# Patient Record
Sex: FEMALE | Race: WHITE | Employment: FULL TIME | URBAN - METROPOLITAN AREA
[De-identification: names, ages, dates, MRNs, and addresses within clinical notes are randomized per-mention and may not be internally consistent; named-entity substitution may affect disease eponyms.]

---

## 2017-05-11 ENCOUNTER — HOSPITAL ENCOUNTER (OUTPATIENT)
Age: 37
Setting detail: SPECIMEN
Discharge: HOME | End: 2017-05-11
Payer: COMMERCIAL

## 2017-05-11 ENCOUNTER — OFFICE VISIT (OUTPATIENT)
Dept: GYNECOLOGIC ONCOLOGY | Age: 37
End: 2017-05-11
Payer: COMMERCIAL

## 2017-05-11 VITALS
OXYGEN SATURATION: 99 % | TEMPERATURE: 98.9 F | BODY MASS INDEX: 22.5 KG/M2 | DIASTOLIC BLOOD PRESSURE: 94 MMHG | WEIGHT: 140 LBS | HEART RATE: 83 BPM | SYSTOLIC BLOOD PRESSURE: 130 MMHG | HEIGHT: 66 IN | RESPIRATION RATE: 19 BRPM

## 2017-05-11 DIAGNOSIS — R87.629 ABNORMAL VAGINAL PAP SMEAR: Primary | ICD-10-CM

## 2017-05-11 PROCEDURE — 99212 OFFICE O/P EST SF 10 MIN: CPT | Performed by: OBSTETRICS & GYNECOLOGY

## 2017-05-11 ASSESSMENT — ENCOUNTER SYMPTOMS
BLOOD IN STOOL: 0
SHORTNESS OF BREATH: 0
ABDOMINAL PAIN: 0
ABDOMINAL DISTENTION: 0
GASTROINTESTINAL NEGATIVE: 1
RESPIRATORY NEGATIVE: 1
EYES NEGATIVE: 1
ALLERGIC/IMMUNOLOGIC NEGATIVE: 1

## 2017-05-15 ENCOUNTER — TELEPHONE (OUTPATIENT)
Dept: GYNECOLOGIC ONCOLOGY | Age: 37
End: 2017-05-15

## 2017-05-15 RX ORDER — FLUCONAZOLE 150 MG/1
150 TABLET ORAL ONCE
Qty: 2 TABLET | Refills: 0 | Status: SHIPPED | OUTPATIENT
Start: 2017-05-15 | End: 2017-05-15

## 2017-05-17 LAB
HPV SAMPLE: NORMAL
HPV SOURCE: NORMAL
HPV, GENOTYPE 16: NOT DETECTED
HPV, GENOTYPE 18: NOT DETECTED
HPV, HIGH RISK OTHER: NOT DETECTED
HPV, INTERPRETATION: NORMAL

## 2017-05-18 LAB — CYTOLOGY REPORT: NORMAL

## 2018-03-06 ENCOUNTER — OFFICE VISIT (OUTPATIENT)
Dept: FAMILY MEDICINE CLINIC | Age: 38
End: 2018-03-06
Payer: COMMERCIAL

## 2018-03-06 VITALS
DIASTOLIC BLOOD PRESSURE: 81 MMHG | OXYGEN SATURATION: 100 % | SYSTOLIC BLOOD PRESSURE: 121 MMHG | TEMPERATURE: 99 F | HEART RATE: 96 BPM | WEIGHT: 142.2 LBS | BODY MASS INDEX: 22.85 KG/M2 | HEIGHT: 66 IN

## 2018-03-06 DIAGNOSIS — Z90.711 S/P SUBTOTAL HYSTERECTOMY: ICD-10-CM

## 2018-03-06 DIAGNOSIS — F43.20 ADJUSTMENT DISORDER, UNSPECIFIED TYPE: ICD-10-CM

## 2018-03-06 DIAGNOSIS — Z83.3 FH: DIABETES MELLITUS: ICD-10-CM

## 2018-03-06 DIAGNOSIS — G56.01 CARPAL TUNNEL SYNDROME OF RIGHT WRIST: ICD-10-CM

## 2018-03-06 DIAGNOSIS — R73.9 HYPERGLYCEMIA: ICD-10-CM

## 2018-03-06 DIAGNOSIS — D64.9 ANEMIA, UNSPECIFIED TYPE: Primary | ICD-10-CM

## 2018-03-06 DIAGNOSIS — E55.9 VITAMIN D DEFICIENCY: ICD-10-CM

## 2018-03-06 DIAGNOSIS — Z13.220 SCREENING, LIPID: ICD-10-CM

## 2018-03-06 DIAGNOSIS — Z82.49 FH: CAD (CORONARY ARTERY DISEASE): ICD-10-CM

## 2018-03-06 DIAGNOSIS — Z13.29 SCREENING FOR THYROID DISORDER: ICD-10-CM

## 2018-03-06 DIAGNOSIS — E53.9 DEFICIENCY OF VITAMIN B: ICD-10-CM

## 2018-03-06 DIAGNOSIS — G47.00 INSOMNIA, UNSPECIFIED TYPE: ICD-10-CM

## 2018-03-06 DIAGNOSIS — Z11.4 SCREENING FOR HIV (HUMAN IMMUNODEFICIENCY VIRUS): ICD-10-CM

## 2018-03-06 DIAGNOSIS — Z77.22 SECOND HAND TOBACCO SMOKE EXPOSURE: ICD-10-CM

## 2018-03-06 PROCEDURE — G8420 CALC BMI NORM PARAMETERS: HCPCS | Performed by: FAMILY MEDICINE

## 2018-03-06 PROCEDURE — G8427 DOCREV CUR MEDS BY ELIG CLIN: HCPCS | Performed by: FAMILY MEDICINE

## 2018-03-06 PROCEDURE — 1036F TOBACCO NON-USER: CPT | Performed by: FAMILY MEDICINE

## 2018-03-06 PROCEDURE — G8484 FLU IMMUNIZE NO ADMIN: HCPCS | Performed by: FAMILY MEDICINE

## 2018-03-06 PROCEDURE — 99204 OFFICE O/P NEW MOD 45 MIN: CPT | Performed by: FAMILY MEDICINE

## 2018-03-06 RX ORDER — HYDROXYZINE HYDROCHLORIDE 10 MG/1
10 TABLET, FILM COATED ORAL NIGHTLY
Qty: 60 TABLET | Refills: 0 | Status: SHIPPED | OUTPATIENT
Start: 2018-03-06 | End: 2018-03-16

## 2018-03-06 RX ORDER — ESCITALOPRAM OXALATE 10 MG/1
10 TABLET ORAL DAILY
Qty: 30 TABLET | Refills: 0 | Status: SHIPPED | OUTPATIENT
Start: 2018-03-06 | End: 2018-03-27 | Stop reason: DRUGHIGH

## 2018-03-06 RX ORDER — IBUPROFEN 200 MG
200 TABLET ORAL
COMMUNITY
End: 2018-03-27 | Stop reason: CLARIF

## 2018-03-06 RX ORDER — LORATADINE 10 MG/1
10 TABLET ORAL DAILY
COMMUNITY
End: 2019-07-02

## 2018-03-06 ASSESSMENT — ENCOUNTER SYMPTOMS
BLOOD IN STOOL: 0
TROUBLE SWALLOWING: 0
EYE REDNESS: 0
SHORTNESS OF BREATH: 0
BACK PAIN: 0
EYE DISCHARGE: 0
COUGH: 0
EYE PAIN: 0
COLOR CHANGE: 0
ABDOMINAL PAIN: 0
NAUSEA: 0
ANAL BLEEDING: 0
VOICE CHANGE: 0
CONSTIPATION: 1
SINUS PRESSURE: 0
DIARRHEA: 0
RECTAL PAIN: 0
VOMITING: 0
ABDOMINAL DISTENTION: 0
CHEST TIGHTNESS: 0

## 2018-03-06 ASSESSMENT — PATIENT HEALTH QUESTIONNAIRE - PHQ9
1. LITTLE INTEREST OR PLEASURE IN DOING THINGS: 0
SUM OF ALL RESPONSES TO PHQ9 QUESTIONS 1 & 2: 0
SUM OF ALL RESPONSES TO PHQ QUESTIONS 1-9: 0
1. LITTLE INTEREST OR PLEASURE IN DOING THINGS: 0
SUM OF ALL RESPONSES TO PHQ QUESTIONS 1-9: 0
SUM OF ALL RESPONSES TO PHQ9 QUESTIONS 1 & 2: 0
2. FEELING DOWN, DEPRESSED OR HOPELESS: 0
2. FEELING DOWN, DEPRESSED OR HOPELESS: 0

## 2018-03-06 NOTE — PROGRESS NOTES
person, place, and time. She has normal reflexes. No cranial nerve deficit. Coordination normal.   Skin: Skin is warm and dry. No rash noted. Psychiatric: She has a normal mood and affect. Tinels and phalens testing done. neg- feels fingers twitch on phalens testing. Assessment:      1. Anemia, unspecified type  CBC    Folate    Vitamin B12    Iron And TIBC   2. Screening for thyroid disorder  T4, Free    T3, Free    TSH without Reflex   3. Screening, lipid  Comprehensive Metabolic Panel    Lipid Panel   4. Deficiency of vitamin B     5. Vitamin D deficiency  Vitamin D 25 Hydroxy   6. Hyperglycemia  Hemoglobin A1C   7. FH: CAD (coronary artery disease)     8. FH: diabetes mellitus     9. Second hand tobacco smoke exposure     10. Screening for HIV (human immunodeficiency virus)  HIV Screen   11. S/P subtotal hysterectomy     12. Carpal tunnel syndrome of right wrist  56013 - Motor/Sens Nrve Conduct Test   13. Adjustment disorder, unspecified type  escitalopram (LEXAPRO) 10 MG tablet   14.  Insomnia, unspecified type  hydrOXYzine (ATARAX) 10 MG tablet         Plan:      Orders Placed This Encounter   Procedures    CBC    Comprehensive Metabolic Panel    Folate    Hemoglobin A1C    HIV Screen    Lipid Panel    T4, Free    T3, Free    TSH without Reflex    Vitamin B12    Vitamin D 25 Hydroxy    Iron And TIBC    31215 - Motor/Sens Nrve Conduct Test       Outpatient Encounter Prescriptions as of 3/6/2018   Medication Sig Dispense Refill    ibuprofen (ADVIL;MOTRIN) 200 MG tablet Take 200 mg by mouth      Multiple Vitamins-Minerals (MULTI-B-PLUS PO) Take by mouth      loratadine (CLARITIN) 10 MG tablet Take 10 mg by mouth daily      escitalopram (LEXAPRO) 10 MG tablet Take 1 tablet by mouth daily 30 tablet 0    hydrOXYzine (ATARAX) 10 MG tablet Take 1 tablet by mouth nightly for 10 days 60 tablet 0    cabergoline (DOSTINEX) 0.5 MG tablet        No facility-administered encounter medications on file as of 3/6/2018.

## 2018-03-11 LAB
ABSOLUTE BASO #: 0 K/UL (ref 0–0.1)
ABSOLUTE EOS #: 0.1 K/UL (ref 0.1–0.4)
ABSOLUTE LYMPH #: 1.2 K/UL (ref 0.8–5.2)
ABSOLUTE MONO #: 0.6 K/UL (ref 0.1–0.9)
ABSOLUTE NEUT #: 2.7 K/UL (ref 1.3–9.1)
ALBUMIN SERPL-MCNC: 4.4 G/DL (ref 3.5–5.2)
ALK PHOSPHATASE: 72 U/L (ref 30–101)
ALT SERPL-CCNC: 10 U/L (ref 5–40)
ANION GAP SERPL CALCULATED.3IONS-SCNC: 12 MEQ/L (ref 10–19)
AST SERPL-CCNC: 19 U/L (ref 9–40)
AVERAGE GLUCOSE: 111 MG/DL (ref 66–114)
BASOPHILS RELATIVE PERCENT: 0.2 %
BILIRUB SERPL-MCNC: 0.4 MG/DL
BUN BLDV-MCNC: 13 MG/DL (ref 8–23)
CALCIUM SERPL-MCNC: 9.5 MG/DL (ref 8.5–10.5)
CHLORIDE BLD-SCNC: 101 MEQ/L (ref 95–107)
CHOLESTEROL/HDL RATIO: 3.4
CHOLESTEROL: 159 MG/DL
CO2: 25 MEQ/L (ref 19–31)
CREAT SERPL-MCNC: 0.7 MG/DL (ref 0.6–1.3)
EGFR AFRICAN AMERICAN: 126.5 ML/MIN/1.73 M2
EGFR IF NONAFRICAN AMERICAN: 109.1 ML/MIN/1.73 M2
EOSINOPHILS RELATIVE PERCENT: 1.3 %
FOLATE: >20 UG/L
GLUCOSE: 85 MG/DL (ref 70–99)
HBA1C MFR BLD: 5.5 % (ref 4.2–5.8)
HCT VFR BLD CALC: 36.3 % (ref 36–48)
HDLC SERPL-MCNC: 47 MG/DL
HEMOGLOBIN: 11.9 G/DL (ref 12–16)
IRON SATURATION: 40 % (ref 20–50)
IRON, SERUM: 112 UG/DL (ref 37–145)
LDL CHOLESTEROL CALCULATED: 98 MG/DL
LDL/HDL RATIO: 2.1
LYMPHOCYTE %: 25.7 %
MCH RBC QN AUTO: 29.8 PG (ref 27–34)
MCHC RBC AUTO-ENTMCNC: 32.8 G/DL (ref 31–36)
MCV RBC AUTO: 91 FL (ref 80–100)
MONOCYTES # BLD: 13.4 %
NEUTROPHILS RELATIVE PERCENT: 59.2 %
PDW BLD-RTO: 12.6 % (ref 10.8–14.8)
PLATELETS: 269 K/UL (ref 150–450)
POTASSIUM SERPL-SCNC: 4.5 MEQ/L (ref 3.5–5.4)
RBC: 3.99 M/UL (ref 4–5.5)
SODIUM BLD-SCNC: 138 MEQ/L (ref 135–146)
T4 FREE: 0.97 NG/DL (ref 0.8–1.9)
TOTAL IRON BINDING CAPACITY: 279 MCG/DL (ref 250–450)
TOTAL PROTEIN: 7.6 G/DL (ref 6.1–8.3)
TRIGL SERPL-MCNC: 72 MG/DL
TSH SERPL DL<=0.05 MIU/L-ACNC: 2.47 UIU/ML (ref 0.4–4.1)
UNSATURATED IRON BINDING CAPACITY: 167 UG/DL (ref 112–347)
VITAMIN B-12: 583 PG/ML (ref 200–950)
VLDLC SERPL CALC-MCNC: 14 MG/DL
WBC: 4.6 K/UL (ref 3.7–10.8)

## 2018-03-12 LAB — HIV 1,2 COMBO ANTIGEN/ANTIBODY: NORMAL

## 2018-03-13 LAB
T3 FREE: 3.2 PG/ML (ref 2.2–4.2)
VITAMIN D 25-HYDROXY: 30 NG/ML

## 2018-03-27 ENCOUNTER — OFFICE VISIT (OUTPATIENT)
Dept: FAMILY MEDICINE CLINIC | Age: 38
End: 2018-03-27
Payer: COMMERCIAL

## 2018-03-27 VITALS
HEIGHT: 66 IN | WEIGHT: 144.8 LBS | SYSTOLIC BLOOD PRESSURE: 115 MMHG | OXYGEN SATURATION: 99 % | BODY MASS INDEX: 23.27 KG/M2 | HEART RATE: 67 BPM | DIASTOLIC BLOOD PRESSURE: 78 MMHG

## 2018-03-27 DIAGNOSIS — G47.00 INSOMNIA, UNSPECIFIED TYPE: ICD-10-CM

## 2018-03-27 DIAGNOSIS — G56.03 BILATERAL CARPAL TUNNEL SYNDROME: ICD-10-CM

## 2018-03-27 DIAGNOSIS — K59.00 CONSTIPATION, UNSPECIFIED CONSTIPATION TYPE: ICD-10-CM

## 2018-03-27 DIAGNOSIS — R53.83 FATIGUE, UNSPECIFIED TYPE: ICD-10-CM

## 2018-03-27 DIAGNOSIS — F39 MOOD DISORDER (HCC): ICD-10-CM

## 2018-03-27 DIAGNOSIS — D64.9 ANEMIA, UNSPECIFIED TYPE: Primary | ICD-10-CM

## 2018-03-27 DIAGNOSIS — Z90.711 S/P SUBTOTAL HYSTERECTOMY: ICD-10-CM

## 2018-03-27 PROCEDURE — G8427 DOCREV CUR MEDS BY ELIG CLIN: HCPCS | Performed by: FAMILY MEDICINE

## 2018-03-27 PROCEDURE — G8484 FLU IMMUNIZE NO ADMIN: HCPCS | Performed by: FAMILY MEDICINE

## 2018-03-27 PROCEDURE — 99214 OFFICE O/P EST MOD 30 MIN: CPT | Performed by: FAMILY MEDICINE

## 2018-03-27 PROCEDURE — 1036F TOBACCO NON-USER: CPT | Performed by: FAMILY MEDICINE

## 2018-03-27 PROCEDURE — G8420 CALC BMI NORM PARAMETERS: HCPCS | Performed by: FAMILY MEDICINE

## 2018-03-27 RX ORDER — HYDROXYZINE HYDROCHLORIDE 10 MG/1
10 TABLET, FILM COATED ORAL NIGHTLY
Qty: 90 TABLET | Refills: 1 | Status: SHIPPED | OUTPATIENT
Start: 2018-03-27 | End: 2018-06-25

## 2018-03-27 RX ORDER — ESCITALOPRAM OXALATE 20 MG/1
20 TABLET ORAL DAILY
Qty: 30 TABLET | Refills: 0 | Status: SHIPPED | OUTPATIENT
Start: 2018-03-27 | End: 2018-05-02 | Stop reason: CLARIF

## 2018-03-27 ASSESSMENT — ENCOUNTER SYMPTOMS
CHEST TIGHTNESS: 0
EYE REDNESS: 0
RECTAL PAIN: 0
ABDOMINAL DISTENTION: 0
CONSTIPATION: 0
TROUBLE SWALLOWING: 0
SHORTNESS OF BREATH: 0
ABDOMINAL PAIN: 0
ANAL BLEEDING: 0
EYE PAIN: 0
DIARRHEA: 0
BLOOD IN STOOL: 0
NAUSEA: 0
SINUS PRESSURE: 0
BACK PAIN: 0
EYE DISCHARGE: 0
COUGH: 0
VOICE CHANGE: 0
VOMITING: 0
COLOR CHANGE: 0

## 2018-03-27 NOTE — PROGRESS NOTES
Subjective:      Patient ID: Isaura Saba is a 34 y.o. female. HPI Here for a recheck- states constipation better after increasing water intake and also sleep has improved after she started the sleep aid prescribed. Lab show mild Anemia- anemia work up was neg. States takes prenatal vitamins daily. Takes only lettuce but no other greens. States mood not any diff after starting lexapro 10 mg and states would like to increase the dose. Her Bowels are better after increasing water. Still not feeling like exercising. Needs Test for CTS sx she has had x 18 months or more  Review of Systems   Constitutional: Positive for fatigue. Negative for activity change and appetite change. HENT: Negative for dental problem, ear pain, hearing loss, postnasal drip, sinus pressure, sneezing, tinnitus, trouble swallowing and voice change. Eyes: Negative for pain, discharge, redness and visual disturbance. Respiratory: Negative for cough, chest tightness and shortness of breath. Cardiovascular: Negative for chest pain, palpitations and leg swelling. Gastrointestinal: Negative for abdominal distention, abdominal pain, anal bleeding, blood in stool, constipation, diarrhea, nausea, rectal pain and vomiting. Endocrine: Negative for cold intolerance, heat intolerance, polydipsia, polyphagia and polyuria. Genitourinary: Negative for decreased urine volume, difficulty urinating, dyspareunia, dysuria, enuresis, flank pain, frequency, genital sores, hematuria, menstrual problem, pelvic pain, urgency, vaginal bleeding and vaginal discharge. Musculoskeletal: Negative for arthralgias, back pain, gait problem, joint swelling, myalgias, neck pain and neck stiffness. Skin: Negative for color change, pallor and rash. Allergic/Immunologic: Negative for environmental allergies, food allergies and immunocompromised state.    Neurological: Negative for dizziness, tremors, seizures, syncope, facial asymmetry, speech difficulty, weakness, light-headedness, numbness and headaches. Hematological: Negative for adenopathy. Does not bruise/bleed easily. Psychiatric/Behavioral: Negative for agitation, behavioral problems, confusion, decreased concentration, sleep disturbance and suicidal ideas. The patient is not nervous/anxious. Objective:   Physical Exam   Constitutional: She is oriented to person, place, and time. She appears well-developed and well-nourished. HENT:   Head: Normocephalic and atraumatic. Right Ear: External ear normal.   Left Ear: External ear normal.   Nose: Nose normal.   Mouth/Throat: Oropharynx is clear and moist. No oropharyngeal exudate. Eyes: Conjunctivae and EOM are normal. Pupils are equal, round, and reactive to light. Right eye exhibits no discharge. Left eye exhibits no discharge. No scleral icterus. Neck: Normal range of motion. Neck supple. No JVD present. No tracheal deviation present. No thyromegaly present. Cardiovascular: Normal rate, regular rhythm, normal heart sounds and intact distal pulses. Exam reveals no gallop and no friction rub. No murmur heard. Pulmonary/Chest: Effort normal and breath sounds normal. No respiratory distress. She has no wheezes. She has no rales. She exhibits no tenderness. Abdominal: Soft. Bowel sounds are normal. She exhibits no distension and no mass. There is no tenderness. There is no rebound and no guarding. Musculoskeletal: Normal range of motion. She exhibits no edema or tenderness. Lymphadenopathy:     She has no cervical adenopathy. Neurological: She is alert and oriented to person, place, and time. She has normal reflexes. No cranial nerve deficit. Coordination normal.   Skin: Skin is warm and dry. No rash noted. Psychiatric: She has a normal mood and affect. Assessment:      1. Anemia, unspecified type  POCT Fecal Immunochemical Test (FIT)    [facility] Deidra Francis MD*   2.  Fatigue,

## 2018-04-05 ENCOUNTER — TELEPHONE (OUTPATIENT)
Dept: FAMILY MEDICINE CLINIC | Age: 38
End: 2018-04-05

## 2018-04-13 ENCOUNTER — TELEPHONE (OUTPATIENT)
Dept: FAMILY MEDICINE CLINIC | Age: 38
End: 2018-04-13

## 2018-05-02 ENCOUNTER — OFFICE VISIT (OUTPATIENT)
Dept: FAMILY MEDICINE CLINIC | Age: 38
End: 2018-05-02
Payer: COMMERCIAL

## 2018-05-02 VITALS
DIASTOLIC BLOOD PRESSURE: 72 MMHG | BODY MASS INDEX: 23.21 KG/M2 | SYSTOLIC BLOOD PRESSURE: 104 MMHG | HEIGHT: 66 IN | WEIGHT: 144.4 LBS | OXYGEN SATURATION: 98 % | HEART RATE: 82 BPM

## 2018-05-02 DIAGNOSIS — K59.00 CONSTIPATION, UNSPECIFIED CONSTIPATION TYPE: ICD-10-CM

## 2018-05-02 DIAGNOSIS — F39 MOOD DISORDER (HCC): ICD-10-CM

## 2018-05-02 DIAGNOSIS — F41.1 GAD (GENERALIZED ANXIETY DISORDER): Primary | ICD-10-CM

## 2018-05-02 DIAGNOSIS — G47.00 INSOMNIA, UNSPECIFIED TYPE: ICD-10-CM

## 2018-05-02 PROCEDURE — G8427 DOCREV CUR MEDS BY ELIG CLIN: HCPCS | Performed by: FAMILY MEDICINE

## 2018-05-02 PROCEDURE — 1036F TOBACCO NON-USER: CPT | Performed by: FAMILY MEDICINE

## 2018-05-02 PROCEDURE — 99214 OFFICE O/P EST MOD 30 MIN: CPT | Performed by: FAMILY MEDICINE

## 2018-05-02 PROCEDURE — G8420 CALC BMI NORM PARAMETERS: HCPCS | Performed by: FAMILY MEDICINE

## 2018-05-02 RX ORDER — PAROXETINE HYDROCHLORIDE 20 MG/1
20 TABLET, FILM COATED ORAL DAILY
Qty: 30 TABLET | Refills: 3 | Status: SHIPPED | OUTPATIENT
Start: 2018-05-02 | End: 2018-09-04 | Stop reason: SDUPTHER

## 2018-05-02 ASSESSMENT — ENCOUNTER SYMPTOMS
SINUS PRESSURE: 0
SHORTNESS OF BREATH: 0
CONSTIPATION: 0
COUGH: 0
EYE REDNESS: 0
DIARRHEA: 0
VOMITING: 0
ANAL BLEEDING: 0
EYE DISCHARGE: 0
CHEST TIGHTNESS: 0
NAUSEA: 0
RECTAL PAIN: 0
BACK PAIN: 0
ABDOMINAL DISTENTION: 0
VOICE CHANGE: 0
COLOR CHANGE: 0
EYE PAIN: 0
BLOOD IN STOOL: 0
TROUBLE SWALLOWING: 0
ABDOMINAL PAIN: 0

## 2018-05-21 ENCOUNTER — HOSPITAL ENCOUNTER (OUTPATIENT)
Age: 38
Setting detail: SPECIMEN
Discharge: HOME | End: 2018-05-21
Payer: COMMERCIAL

## 2018-05-24 ENCOUNTER — OFFICE VISIT (OUTPATIENT)
Dept: GYNECOLOGIC ONCOLOGY | Age: 38
End: 2018-05-24
Payer: COMMERCIAL

## 2018-05-24 ENCOUNTER — HOSPITAL ENCOUNTER (OUTPATIENT)
Age: 38
Setting detail: SPECIMEN
Discharge: HOME | End: 2018-05-24
Payer: COMMERCIAL

## 2018-05-24 VITALS
HEART RATE: 77 BPM | OXYGEN SATURATION: 100 % | WEIGHT: 142.6 LBS | SYSTOLIC BLOOD PRESSURE: 115 MMHG | DIASTOLIC BLOOD PRESSURE: 86 MMHG | BODY MASS INDEX: 22.92 KG/M2 | TEMPERATURE: 98.5 F | HEIGHT: 66 IN

## 2018-05-24 DIAGNOSIS — Z12.31 ENCOUNTER FOR SCREENING MAMMOGRAM FOR BREAST CANCER: ICD-10-CM

## 2018-05-24 DIAGNOSIS — Z12.72 SCREENING FOR VAGINAL CANCER: Primary | ICD-10-CM

## 2018-05-24 DIAGNOSIS — R30.0 DYSURIA: ICD-10-CM

## 2018-05-24 DIAGNOSIS — Z90.710 HX OF TOTAL HYSTERECTOMY: ICD-10-CM

## 2018-05-24 LAB — SURGICAL PATHOLOGY REPORT: NORMAL

## 2018-05-24 PROCEDURE — G8427 DOCREV CUR MEDS BY ELIG CLIN: HCPCS | Performed by: OBSTETRICS & GYNECOLOGY

## 2018-05-24 PROCEDURE — 99213 OFFICE O/P EST LOW 20 MIN: CPT | Performed by: OBSTETRICS & GYNECOLOGY

## 2018-05-24 PROCEDURE — G8420 CALC BMI NORM PARAMETERS: HCPCS | Performed by: OBSTETRICS & GYNECOLOGY

## 2018-05-24 PROCEDURE — 1036F TOBACCO NON-USER: CPT | Performed by: OBSTETRICS & GYNECOLOGY

## 2018-05-24 RX ORDER — SULFAMETHOXAZOLE AND TRIMETHOPRIM 400; 80 MG/1; MG/1
1 TABLET ORAL 2 TIMES DAILY
Qty: 20 TABLET | Refills: 0 | Status: SHIPPED | OUTPATIENT
Start: 2018-05-24 | End: 2018-06-03

## 2018-05-24 RX ORDER — PHENAZOPYRIDINE HYDROCHLORIDE 200 MG/1
200 TABLET, FILM COATED ORAL 3 TIMES DAILY PRN
Qty: 9 TABLET | Refills: 0 | Status: SHIPPED | OUTPATIENT
Start: 2018-05-24 | End: 2018-05-27

## 2018-05-24 RX ORDER — FLUCONAZOLE 150 MG/1
150 TABLET ORAL ONCE
Qty: 1 TABLET | Refills: 0 | Status: SHIPPED | OUTPATIENT
Start: 2018-05-24 | End: 2018-05-24

## 2018-05-24 ASSESSMENT — ENCOUNTER SYMPTOMS
BLOOD IN STOOL: 0
RESPIRATORY NEGATIVE: 1
EYES NEGATIVE: 1
GASTROINTESTINAL NEGATIVE: 1
ABDOMINAL DISTENTION: 0
ABDOMINAL PAIN: 0
SHORTNESS OF BREATH: 0
ALLERGIC/IMMUNOLOGIC NEGATIVE: 1

## 2018-05-31 ENCOUNTER — HOSPITAL ENCOUNTER (OUTPATIENT)
Age: 38
End: 2018-05-31
Payer: COMMERCIAL

## 2018-05-31 ENCOUNTER — HOSPITAL ENCOUNTER (OUTPATIENT)
Dept: MAMMOGRAPHY | Age: 38
Discharge: HOME | End: 2018-06-02
Payer: COMMERCIAL

## 2018-05-31 DIAGNOSIS — Z12.31 ENCOUNTER FOR SCREENING MAMMOGRAM FOR BREAST CANCER: ICD-10-CM

## 2018-05-31 PROCEDURE — 77067 SCR MAMMO BI INCL CAD: CPT

## 2018-06-04 DIAGNOSIS — R92.2 DENSE BREAST TISSUE ON MAMMOGRAM: Primary | ICD-10-CM

## 2018-06-12 LAB — CYTOLOGY REPORT: NORMAL

## 2018-09-06 ENCOUNTER — TELEPHONE (OUTPATIENT)
Dept: GYNECOLOGIC ONCOLOGY | Age: 38
End: 2018-09-06

## 2018-09-21 ENCOUNTER — OFFICE VISIT (OUTPATIENT)
Dept: FAMILY MEDICINE CLINIC | Age: 38
End: 2018-09-21
Payer: COMMERCIAL

## 2018-09-21 VITALS
OXYGEN SATURATION: 100 % | SYSTOLIC BLOOD PRESSURE: 136 MMHG | HEART RATE: 85 BPM | HEIGHT: 66 IN | BODY MASS INDEX: 25.49 KG/M2 | WEIGHT: 158.6 LBS | DIASTOLIC BLOOD PRESSURE: 100 MMHG

## 2018-09-21 DIAGNOSIS — G47.00 INSOMNIA, UNSPECIFIED TYPE: ICD-10-CM

## 2018-09-21 DIAGNOSIS — I51.7 LVH (LEFT VENTRICULAR HYPERTROPHY): ICD-10-CM

## 2018-09-21 DIAGNOSIS — I10 ESSENTIAL HYPERTENSION: ICD-10-CM

## 2018-09-21 DIAGNOSIS — F41.1 GAD (GENERALIZED ANXIETY DISORDER): Primary | ICD-10-CM

## 2018-09-21 PROCEDURE — 99214 OFFICE O/P EST MOD 30 MIN: CPT | Performed by: FAMILY MEDICINE

## 2018-09-21 PROCEDURE — G8427 DOCREV CUR MEDS BY ELIG CLIN: HCPCS | Performed by: FAMILY MEDICINE

## 2018-09-21 PROCEDURE — 1036F TOBACCO NON-USER: CPT | Performed by: FAMILY MEDICINE

## 2018-09-21 PROCEDURE — G8419 CALC BMI OUT NRM PARAM NOF/U: HCPCS | Performed by: FAMILY MEDICINE

## 2018-09-21 RX ORDER — PAROXETINE HYDROCHLORIDE 40 MG/1
40 TABLET, FILM COATED ORAL DAILY
Qty: 90 TABLET | Refills: 0 | Status: SHIPPED | OUTPATIENT
Start: 2018-09-21 | End: 2019-06-13

## 2018-09-21 RX ORDER — BISOPROLOL FUMARATE 10 MG/1
10 TABLET ORAL DAILY
Qty: 30 TABLET | Refills: 3 | Status: SHIPPED | OUTPATIENT
Start: 2018-09-21 | End: 2019-07-02 | Stop reason: ALTCHOICE

## 2018-09-21 ASSESSMENT — ENCOUNTER SYMPTOMS
EYE REDNESS: 0
TROUBLE SWALLOWING: 0
CONSTIPATION: 0
VOICE CHANGE: 0
RECTAL PAIN: 0
BLOOD IN STOOL: 0
COLOR CHANGE: 0
SHORTNESS OF BREATH: 0
BACK PAIN: 0
VOMITING: 0
CHEST TIGHTNESS: 0
ANAL BLEEDING: 0
ABDOMINAL DISTENTION: 0
ABDOMINAL PAIN: 0
DIARRHEA: 0
NAUSEA: 0
EYE PAIN: 0
SINUS PRESSURE: 0
COUGH: 0
EYE DISCHARGE: 0

## 2018-09-21 NOTE — PROGRESS NOTES
Subjective:      Patient ID: Sriram Mckeon is a 34 y.o. female. HPI States under stress a lot recently, dog passed on,  had injury, mom got into an accident and had to have surgery. Also few weeks ago grandma passed away. Sleep ok, wakes up once or twice. Not exercising as she feels dizzy and gets chest pains. Event going up a flight of stairs feels dizzy and winded. Started with Headaches a couple of weeks ago as well. Review of Systems   Constitutional: Negative for activity change, appetite change and fatigue. HENT: Negative for dental problem, ear pain, hearing loss, postnasal drip, sinus pressure, sneezing, tinnitus, trouble swallowing and voice change. Eyes: Negative for pain, discharge, redness and visual disturbance. Respiratory: Negative for cough, chest tightness and shortness of breath. Cardiovascular: Negative for chest pain, palpitations and leg swelling. Gastrointestinal: Negative for abdominal distention, abdominal pain, anal bleeding, blood in stool, constipation, diarrhea, nausea, rectal pain and vomiting. Endocrine: Negative for cold intolerance, heat intolerance, polydipsia, polyphagia and polyuria. Genitourinary: Negative for decreased urine volume, difficulty urinating, dyspareunia, dysuria, enuresis, flank pain, frequency, genital sores, hematuria, menstrual problem, pelvic pain, urgency, vaginal bleeding and vaginal discharge. Musculoskeletal: Negative for arthralgias, back pain, gait problem, joint swelling, myalgias, neck pain and neck stiffness. Skin: Negative for color change, pallor and rash. Allergic/Immunologic: Negative for environmental allergies, food allergies and immunocompromised state. Neurological: Positive for headaches. Negative for dizziness, tremors, seizures, syncope, facial asymmetry, speech difficulty, weakness, light-headedness and numbness. Hematological: Negative for adenopathy. Does not bruise/bleed easily.

## 2018-09-24 ENCOUNTER — OFFICE VISIT (OUTPATIENT)
Dept: FAMILY MEDICINE CLINIC | Age: 38
End: 2018-09-24
Payer: COMMERCIAL

## 2018-09-24 VITALS
HEART RATE: 63 BPM | OXYGEN SATURATION: 99 % | BODY MASS INDEX: 25.68 KG/M2 | WEIGHT: 159.8 LBS | SYSTOLIC BLOOD PRESSURE: 121 MMHG | DIASTOLIC BLOOD PRESSURE: 82 MMHG | HEIGHT: 66 IN

## 2018-09-24 DIAGNOSIS — F41.1 GAD (GENERALIZED ANXIETY DISORDER): ICD-10-CM

## 2018-09-24 DIAGNOSIS — I10 ESSENTIAL HYPERTENSION: Primary | ICD-10-CM

## 2018-09-24 DIAGNOSIS — F39 MOOD DISORDER (HCC): ICD-10-CM

## 2018-09-24 PROCEDURE — 1036F TOBACCO NON-USER: CPT | Performed by: FAMILY MEDICINE

## 2018-09-24 PROCEDURE — 99213 OFFICE O/P EST LOW 20 MIN: CPT | Performed by: FAMILY MEDICINE

## 2018-09-24 PROCEDURE — G8427 DOCREV CUR MEDS BY ELIG CLIN: HCPCS | Performed by: FAMILY MEDICINE

## 2018-09-24 PROCEDURE — G8419 CALC BMI OUT NRM PARAM NOF/U: HCPCS | Performed by: FAMILY MEDICINE

## 2018-09-24 ASSESSMENT — ENCOUNTER SYMPTOMS
COLOR CHANGE: 0
SHORTNESS OF BREATH: 0
ABDOMINAL DISTENTION: 0
NAUSEA: 0
CHEST TIGHTNESS: 0
CONSTIPATION: 0
VOICE CHANGE: 0
SINUS PRESSURE: 0
BLOOD IN STOOL: 0
EYE DISCHARGE: 0
BACK PAIN: 0
EYE REDNESS: 0
RECTAL PAIN: 0
DIARRHEA: 0
ABDOMINAL PAIN: 0
TROUBLE SWALLOWING: 0
VOMITING: 0
COUGH: 0
ANAL BLEEDING: 0
EYE PAIN: 0

## 2018-09-24 ASSESSMENT — PATIENT HEALTH QUESTIONNAIRE - PHQ9
SUM OF ALL RESPONSES TO PHQ9 QUESTIONS 1 & 2: 0
1. LITTLE INTEREST OR PLEASURE IN DOING THINGS: 0
SUM OF ALL RESPONSES TO PHQ QUESTIONS 1-9: 0
2. FEELING DOWN, DEPRESSED OR HOPELESS: 0
SUM OF ALL RESPONSES TO PHQ QUESTIONS 1-9: 0

## 2018-09-24 NOTE — PROGRESS NOTES
sleep disturbance and suicidal ideas. The patient is not nervous/anxious. Objective:   Physical Exam   Constitutional: She is oriented to person, place, and time. She appears well-developed and well-nourished. HENT:   Head: Normocephalic and atraumatic. Right Ear: External ear normal.   Left Ear: External ear normal.   Nose: Nose normal.   Mouth/Throat: Oropharynx is clear and moist. No oropharyngeal exudate. Eyes: Pupils are equal, round, and reactive to light. Conjunctivae and EOM are normal. Right eye exhibits no discharge. Left eye exhibits no discharge. No scleral icterus. Neck: Normal range of motion. Neck supple. No JVD present. No tracheal deviation present. No thyromegaly present. Cardiovascular: Normal rate, regular rhythm, normal heart sounds and intact distal pulses. Exam reveals no gallop and no friction rub. No murmur heard. Pulmonary/Chest: Effort normal and breath sounds normal. No respiratory distress. She has no wheezes. She has no rales. She exhibits no tenderness. Abdominal: Soft. Bowel sounds are normal. She exhibits no distension and no mass. There is no tenderness. There is no rebound and no guarding. Musculoskeletal: Normal range of motion. She exhibits no edema or tenderness. Lymphadenopathy:     She has no cervical adenopathy. Neurological: She is alert and oriented to person, place, and time. She has normal reflexes. No cranial nerve deficit. Coordination normal.   Skin: Skin is warm and dry. No rash noted. Psychiatric: She has a normal mood and affect. Assessment:       Diagnosis Orders   1. Essential hypertension     2. JOSHUA (generalized anxiety disorder)     3. Mood disorder ([facility].)           Plan:      No orders of the defined types were placed in this encounter.       Outpatient Encounter Prescriptions as of 9/24/2018   Medication Sig Dispense Refill    PARoxetine (PAXIL) 40 MG tablet Take 1 tablet by mouth daily 90 tablet 0    bisoprolol (ZEBETA) 10 MG tablet Take 1 tablet by mouth daily 30 tablet 3    Multiple Vitamins-Minerals (MULTI-B-PLUS PO) Take by mouth      loratadine (CLARITIN) 10 MG tablet Take 10 mg by mouth daily      cabergoline (DOSTINEX) 0.5 MG tablet        No facility-administered encounter medications on file as of 9/24/2018.             Aguilar Saenz MD

## 2018-10-19 ENCOUNTER — HOSPITAL ENCOUNTER (OUTPATIENT)
Dept: MAMMOGRAPHY | Age: 38
Discharge: HOME | End: 2018-10-21
Payer: COMMERCIAL

## 2018-10-19 DIAGNOSIS — R92.2 DENSE BREAST TISSUE ON MAMMOGRAM: ICD-10-CM

## 2018-10-19 PROCEDURE — G0279 TOMOSYNTHESIS, MAMMO: HCPCS

## 2019-03-12 ENCOUNTER — HOSPITAL ENCOUNTER (OUTPATIENT)
Age: 39
Discharge: HOME | End: 2019-03-12

## 2019-03-12 LAB — RUBV IGG SER QL: 3.9 IU/ML

## 2019-03-12 PROCEDURE — 86481 TB AG RESPONSE T-CELL SUSP: CPT

## 2019-03-12 PROCEDURE — 86765 RUBEOLA ANTIBODY: CPT

## 2019-03-12 PROCEDURE — 86762 RUBELLA ANTIBODY: CPT

## 2019-03-12 PROCEDURE — 86735 MUMPS ANTIBODY: CPT

## 2019-03-12 PROCEDURE — 86787 VARICELLA-ZOSTER ANTIBODY: CPT

## 2019-03-15 LAB — T-SPOT TB TEST: NORMAL

## 2019-03-17 LAB
MEASLES IMMUNE (IGG): 1.52
MUV IGG SER QL: 0.98
VZV IGG SER QL IA: 2.13

## 2019-03-22 ENCOUNTER — HOSPITAL ENCOUNTER (OUTPATIENT)
Age: 39
Discharge: HOME | End: 2019-03-22

## 2019-03-22 LAB — HCG, PREGNANCY URINE (POC): NEGATIVE

## 2019-03-22 PROCEDURE — 84703 CHORIONIC GONADOTROPIN ASSAY: CPT

## 2019-06-12 NOTE — PROGRESS NOTES
Lauren Anthony is a 37 y.o.  here for her annual exam.  The patient was seen and examined. The patients past medical, surgical, social and family history were reviewed. Current medications and allergies were reviewed, and documented in the chart. She is employed through [employer] for [employer]. She has 2 children over 18. Exercise Yes and intermittently  Diet Yes  Tobacco abuse No    Last PAP: May 2018- normal, hx of abnormal PAP yes - vaginal hr HPV  HAd vaginal hysterectomy in  for AUB, no hx of abnormal paps prior to hysterectomy. 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 Pap smears in 2014, 2014, 2015 and 2016 showed low-grade squamous intraepithelial lesion. Vaginal biopsy in 2016- mild dysplasia. PAP in [place] 2017 ASCUS with + HR HPV other vaginal biopsy at that time negative. She last saw gynecology oncologist Dr. Tara Morris in May 2018 and said if that negative cytology and negative HPV could follow with gynecologist for follow up pap in a year. Family hx uterine or ovarian cancer-denies  Last mammogram- October - diagnostic, after screening [place] 2018 showed asymmetry , mammogram scheduled for 19 Family hx of breast cancer - maternal aunt at 52 neg BRCA  Possibly hormone related was told to tell family not to take hormones. Denies family hx colon cancer. Sexually active: yes - with , multiple partners: No, Dyspareunia: No, Vaginal discharge: no,  UTI symptoms: no, voiding difficulties: no, bowels regular:has IBS mixed constipation/diarrhea bloating:no      Menstrual hx- had hysterectomy with ovaries preserved in  for AUB.      OB History    Para Term  AB Living   2 2 0     2   SAB TAB Ectopic Molar Multiple Live Births                    # Outcome Date GA Lbr Ammon/2nd Weight Sex Delivery Anes PTL Lv   2 Para      Vag-Spont      1 Para      Vag-Spont by mouth daily 30 tablet 3    Multiple Vitamins-Minerals (MULTI-B-PLUS PO) Take by mouth      loratadine (CLARITIN) 10 MG tablet Take 10 mg by mouth daily      cabergoline (DOSTINEX) 0.5 MG tablet        No current facility-administered medications for this visit. Subjective:     Review of Systems   Constitutional: Negative for chills, fatigue, fever and unexpected weight change. HENT: Negative for congestion and rhinorrhea. Eyes: Negative for visual disturbance. Respiratory: Negative for cough and shortness of breath. Cardiovascular: Negative for chest pain, palpitations and leg swelling. Gastrointestinal: Positive for constipation and diarrhea. Negative for abdominal pain, nausea and vomiting. Endocrine: Negative for cold intolerance, heat intolerance, polydipsia and polyuria. Genitourinary: Negative for dyspareunia, dysuria, flank pain, menstrual problem, pelvic pain, vaginal bleeding, vaginal discharge and vaginal pain. Musculoskeletal: Negative for back pain and myalgias. Skin: Negative for color change and rash. Neurological: Negative for dizziness, light-headedness and headaches. Hematological: Negative for adenopathy. Does not bruise/bleed easily. Psychiatric/Behavioral: Negative for self-injury and suicidal ideas. Objective:     Physical Exam   Constitutional: She is oriented to person, place, and time. She appears well-developed and well-nourished. No distress. HENT:   Head: Normocephalic and atraumatic. Right Ear: External ear normal.   Left Ear: External ear normal.   Nose: Nose normal.   Mouth/Throat: Oropharynx is clear and moist.   Eyes: Pupils are equal, round, and reactive to light. EOM are normal.   Neck: Normal range of motion. Neck supple. Cardiovascular: Normal rate, regular rhythm and normal heart sounds. Exam reveals no gallop and no friction rub. No murmur heard. Pulmonary/Chest: Effort normal and breath sounds normal. She has no wheezes. Abdominal: Soft. Bowel sounds are normal. There is no tenderness. There is no rigidity, no rebound, no guarding and no CVA tenderness. Hernia confirmed negative in the right inguinal area and confirmed negative in the left inguinal area. Genitourinary: Vagina normal and uterus normal. No labial fusion. There is no rash, tenderness, lesion or injury on the right labia. There is no rash, tenderness, lesion or injury on the left labia. Uterus is not enlarged and not tender. Cervix exhibits no motion tenderness, no discharge and no friability. Right adnexum displays no mass, no tenderness and no fullness. Left adnexum displays no mass, no tenderness and no fullness. No erythema, tenderness or bleeding in the vagina. No foreign body in the vagina. No signs of injury around the vagina. No vaginal discharge found. Musculoskeletal: Normal range of motion. Lymphadenopathy:        Right: No inguinal adenopathy present. Left: No inguinal adenopathy present. Neurological: She is alert and oriented to person, place, and time. No cranial nerve deficit. Skin: Skin is warm and dry. No rash noted. She is not diaphoretic. Psychiatric: She has a normal mood and affect. Her behavior is normal. Judgment and thought content normal.   Nursing note and vitals reviewed. /60 (Site: Right Upper Arm, Position: Sitting, Cuff Size: Medium Adult)   Ht 5' 6\" (1.676 m)   Wt 161 lb (73 kg)   BMI 25.99 kg/m²     Assessment:       Diagnosis Orders   1. Encounter for annual routine gynecological examination  PAP SMEAR   2. Vaginal high risk human papillomavirus (HPV) DNA test positive  PAP SMEAR       Breast exam completed. Pelvic exam pap smear collected and sent. Cultures sent No    Plan:   Collect pap- if negative cytology and -hpv follow up one year  BSE reviewed,  Mammogram scheduled 6/19/19    Cultures declined     Refill medication if appropriate  Diet & Exercise reviewed with pt.   Preventive  Health through PCP   RV prn/annual           Orders Placed This Encounter   Procedures    PAP SMEAR     Patient History:    No LMP recorded. Patient has had a hysterectomy. OBGYN Status: Hysterectomy  Past Surgical History:  6/26/14: COLPOSCOPY  2/12/2015: COLPOSCOPY      Comment:  due to lgsil on pap  11/2006: HYSTERECTOMY      Comment:  TVH; irregular bleeding, both ovaries are in place  No date: HYSTERECTOMY, VAGINAL  No date: OTHER SURGICAL HISTORY      Comment:  Septaplasty  1980: THROAT SURGERY      Comment:  cyst from throat      Social History    Tobacco Use      Smoking status: Never Smoker      Smokeless tobacco: Never Used       Standing Status:   Future     Standing Expiration Date:   6/13/2020     Order Specific Question:   Collection Type     Answer: Thin Prep     Order Specific Question:   Prior Abnormal Pap Test     Answer:   No     Order Specific Question:   Screening or Diagnostic     Answer:   Screening     Order Specific Question:   HPV Requested? Answer:   Yes     Order Specific Question:   High Risk Patient     Answer:   N/A        Patient given educational materials - seepatient instructions. Discussed use, benefit, and side effects of prescribed medications. All patient questions answered. Pt voiced understanding. Reviewed health maintenance. Instructed to continue current medications, diet and exercise. Patient agreedwith treatment plan. Follow up as directed.       Electronically signed by PADMINI Montoya CNP on 6/13/2019at 8:29 AM

## 2019-06-13 ENCOUNTER — HOSPITAL ENCOUNTER (OUTPATIENT)
Age: 39
Setting detail: SPECIMEN
Discharge: HOME | End: 2019-06-13
Payer: COMMERCIAL

## 2019-06-13 ENCOUNTER — OFFICE VISIT (OUTPATIENT)
Dept: OBGYN CLINIC | Age: 39
End: 2019-06-13
Payer: COMMERCIAL

## 2019-06-13 VITALS
HEIGHT: 66 IN | BODY MASS INDEX: 25.88 KG/M2 | DIASTOLIC BLOOD PRESSURE: 60 MMHG | SYSTOLIC BLOOD PRESSURE: 120 MMHG | WEIGHT: 161 LBS

## 2019-06-13 DIAGNOSIS — Z01.419 ENCOUNTER FOR ANNUAL ROUTINE GYNECOLOGICAL EXAMINATION: Primary | ICD-10-CM

## 2019-06-13 DIAGNOSIS — R87.811 VAGINAL HIGH RISK HUMAN PAPILLOMAVIRUS (HPV) DNA TEST POSITIVE: ICD-10-CM

## 2019-06-13 PROCEDURE — 99386 PREV VISIT NEW AGE 40-64: CPT | Performed by: NURSE PRACTITIONER

## 2019-06-13 ASSESSMENT — ENCOUNTER SYMPTOMS
NAUSEA: 0
BACK PAIN: 0
ABDOMINAL PAIN: 0
DIARRHEA: 1
COLOR CHANGE: 0
COUGH: 0
VOMITING: 0
RHINORRHEA: 0
CONSTIPATION: 1
SHORTNESS OF BREATH: 0

## 2019-06-13 NOTE — PATIENT INSTRUCTIONS
for you. · Ages 21 to 44: Some experts recommend that women have a clinical breast exam every 3 years, starting at age 21. Ask your doctor how often you should have this test. If you have a high risk for breast cancer, talk with your doctor about when to start yearly mammograms and other screening tests. · Ages 36 and older: Talk with your doctor about how often you should have mammograms and clinical breast exams. What is your risk for breast cancer? If you don't already know your risk of breast cancer, you can ask your doctor about it. You can also look it up at www.cancer.gov/bcrisktool/. If your doctor says that you have a high or very high risk, ask about ways to reduce your risk. These could include getting extra screening, taking medicine, or having surgery. If you have a strong family history of breast cancer, ask your doctor about genetic testing. What steps can you take to stay healthy? Some things that increase your risk of breast cancer, such as your age and being female, cannot be controlled. But you can do some things to stay as healthy as you can. · Learn what your breasts normally look and feel like. If you notice any changes, tell your doctor. · Drink alcohol wisely. Your risk goes up the more you drink. For the best health, women should have no more than 1 drink a day or 7 drinks a week. · If you smoke, quit. When you quit smoking, you lower your chances of getting many types of cancer. You can also do your best to eat well, be active, and stay at a healthy weight. Eating healthy foods and being active every day, as well as staying at a healthy weight, may help prevent cancer. Where can you learn more? Go to https://presley.[employer]. org and sign in to your [facility] ([facility]) account. Enter S449 in the [employer] box to learn more about \"Learning About Breast Cancer Screening. \"     If you do not have an account, please click on the \"Sign Up Now\" link.   Current as of: December 19, 2018  Content Version: 12.0  © 7434-2194 [employer]. Care instructions adapted under license by [facility] If you have questions about a medical condition or this instruction, always ask your healthcare professional. [address] any warranty or liability for your use of this information. Pap Test: Care Instructions  Your Care Instructions    The Pap test (also called a Pap smear) is a screening test for cancer of the cervix, which is the lower part of the uterus that opens into the vagina. The test can help your doctor find early changes in the cells that could lead to cancer. The sample of cells taken during your test has been sent to a lab so that an expert can look at the cells. It usually takes a week or two to get the results back. Follow-up care is a key part of your treatment and safety. Be sure to make and go to all appointments, and call your doctor if you are having problems. It's also a good idea to know your test results and keep a list of the medicines you take. What do the results mean? · A normal result means that the test did not find any abnormal cells in the sample. · An abnormal result can mean many things. Most of these are not cancer. The results of your test may be abnormal because:  ? You have an infection of the vagina or cervix, such as a yeast infection. ? You have an IUD (intrauterine device for birth control). ? You have low estrogen levels after menopause that are causing the cells to change. ? You have cell changes that may be a sign of precancer or cancer. The results are ranked based on how serious the changes might be. There are many other reasons why you might not get a normal result. If the results were abnormal, you may need to get another test within a few weeks or months.  If the results show changes that could be a sign of cancer, you may need a test called a colposcopy, which provides a more complete view

## 2019-06-18 DIAGNOSIS — B37.31 VAGINAL CANDIDA: Primary | ICD-10-CM

## 2019-06-18 LAB
CYTOLOGY REPORT: NORMAL
HPV SAMPLE: NORMAL
HPV, GENOTYPE 16: NOT DETECTED
HPV, GENOTYPE 18: NOT DETECTED
HPV, HIGH RISK OTHER: NOT DETECTED
HPV, INTERPRETATION: NORMAL
SPECIMEN DESCRIPTION: NORMAL

## 2019-06-18 RX ORDER — FLUCONAZOLE 150 MG/1
150 TABLET ORAL ONCE
Qty: 1 TABLET | Refills: 0 | Status: SHIPPED | OUTPATIENT
Start: 2019-06-18 | End: 2019-06-18

## 2019-06-19 ENCOUNTER — HOSPITAL ENCOUNTER (OUTPATIENT)
Dept: MAMMOGRAPHY | Age: 39
Discharge: HOME | End: 2019-06-21
Payer: COMMERCIAL

## 2019-06-19 DIAGNOSIS — Z12.39 BREAST CANCER SCREENING: ICD-10-CM

## 2019-06-19 PROCEDURE — 77063 BREAST TOMOSYNTHESIS BI: CPT

## 2019-06-21 ENCOUNTER — TELEPHONE (OUTPATIENT)
Dept: OBGYN CLINIC | Age: 39
End: 2019-06-21

## 2019-07-02 ENCOUNTER — OFFICE VISIT (OUTPATIENT)
Dept: PRIMARY CARE CLINIC | Age: 39
End: 2019-07-02
Payer: COMMERCIAL

## 2019-07-02 ENCOUNTER — TELEPHONE (OUTPATIENT)
Dept: PRIMARY CARE CLINIC | Age: 39
End: 2019-07-02

## 2019-07-02 VITALS
WEIGHT: 162 LBS | OXYGEN SATURATION: 97 % | HEIGHT: 66 IN | SYSTOLIC BLOOD PRESSURE: 132 MMHG | DIASTOLIC BLOOD PRESSURE: 98 MMHG | HEART RATE: 95 BPM | BODY MASS INDEX: 26.03 KG/M2

## 2019-07-02 DIAGNOSIS — J30.9 ALLERGIC RHINITIS, UNSPECIFIED SEASONALITY, UNSPECIFIED TRIGGER: ICD-10-CM

## 2019-07-02 DIAGNOSIS — R20.0 NUMBNESS AND TINGLING IN BOTH HANDS: ICD-10-CM

## 2019-07-02 DIAGNOSIS — M54.2 NECK PAIN: ICD-10-CM

## 2019-07-02 DIAGNOSIS — R51.9 CHRONIC DAILY HEADACHE: ICD-10-CM

## 2019-07-02 DIAGNOSIS — Z13.31 POSITIVE DEPRESSION SCREENING: ICD-10-CM

## 2019-07-02 DIAGNOSIS — R53.83 FATIGUE, UNSPECIFIED TYPE: ICD-10-CM

## 2019-07-02 DIAGNOSIS — F41.8 DEPRESSION WITH ANXIETY: ICD-10-CM

## 2019-07-02 DIAGNOSIS — Z00.00 HEALTH CARE MAINTENANCE: Primary | ICD-10-CM

## 2019-07-02 DIAGNOSIS — R20.2 NUMBNESS AND TINGLING IN BOTH HANDS: ICD-10-CM

## 2019-07-02 PROCEDURE — G8427 DOCREV CUR MEDS BY ELIG CLIN: HCPCS | Performed by: PHYSICIAN ASSISTANT

## 2019-07-02 PROCEDURE — 99204 OFFICE O/P NEW MOD 45 MIN: CPT | Performed by: PHYSICIAN ASSISTANT

## 2019-07-02 PROCEDURE — G8419 CALC BMI OUT NRM PARAM NOF/U: HCPCS | Performed by: PHYSICIAN ASSISTANT

## 2019-07-02 PROCEDURE — G8431 POS CLIN DEPRES SCRN F/U DOC: HCPCS | Performed by: PHYSICIAN ASSISTANT

## 2019-07-02 PROCEDURE — G0444 DEPRESSION SCREEN ANNUAL: HCPCS | Performed by: PHYSICIAN ASSISTANT

## 2019-07-02 PROCEDURE — 1036F TOBACCO NON-USER: CPT | Performed by: PHYSICIAN ASSISTANT

## 2019-07-02 RX ORDER — OMEPRAZOLE 20 MG/1
20 CAPSULE, DELAYED RELEASE ORAL DAILY
COMMUNITY
End: 2020-04-17

## 2019-07-02 RX ORDER — ESCITALOPRAM OXALATE 10 MG/1
10 TABLET ORAL DAILY
Qty: 30 TABLET | Refills: 1 | Status: SHIPPED | OUTPATIENT
Start: 2019-07-02 | End: 2019-07-29 | Stop reason: SDUPTHER

## 2019-07-02 RX ORDER — CELECOXIB 200 MG/1
200 CAPSULE ORAL 2 TIMES DAILY
Qty: 60 CAPSULE | Refills: 0 | Status: SHIPPED | OUTPATIENT
Start: 2019-07-02 | End: 2019-08-15 | Stop reason: SDUPTHER

## 2019-07-02 SDOH — HEALTH STABILITY: MENTAL HEALTH: HOW OFTEN DO YOU HAVE A DRINK CONTAINING ALCOHOL?: MONTHLY OR LESS

## 2019-07-02 ASSESSMENT — ENCOUNTER SYMPTOMS
CHEST TIGHTNESS: 0
VOICE CHANGE: 0
COUGH: 0
BACK PAIN: 0
SHORTNESS OF BREATH: 0
PHOTOPHOBIA: 0
DIARRHEA: 0
EYE ITCHING: 0
VOMITING: 0
CONSTIPATION: 0
BLOOD IN STOOL: 0
ABDOMINAL PAIN: 0
TROUBLE SWALLOWING: 0
EYE PAIN: 0
NAUSEA: 0

## 2019-07-02 ASSESSMENT — PATIENT HEALTH QUESTIONNAIRE - PHQ9
10. IF YOU CHECKED OFF ANY PROBLEMS, HOW DIFFICULT HAVE THESE PROBLEMS MADE IT FOR YOU TO DO YOUR WORK, TAKE CARE OF THINGS AT HOME, OR GET ALONG WITH OTHER PEOPLE: 2
SUM OF ALL RESPONSES TO PHQ QUESTIONS 1-9: 12
6. FEELING BAD ABOUT YOURSELF - OR THAT YOU ARE A FAILURE OR HAVE LET YOURSELF OR YOUR FAMILY DOWN: 1
1. LITTLE INTEREST OR PLEASURE IN DOING THINGS: 1
SUM OF ALL RESPONSES TO PHQ9 QUESTIONS 1 & 2: 4
SUM OF ALL RESPONSES TO PHQ QUESTIONS 1-9: 12
7. TROUBLE CONCENTRATING ON THINGS, SUCH AS READING THE NEWSPAPER OR WATCHING TELEVISION: 1
5. POOR APPETITE OR OVEREATING: 2
3. TROUBLE FALLING OR STAYING ASLEEP: 1
9. THOUGHTS THAT YOU WOULD BE BETTER OFF DEAD, OR OF HURTING YOURSELF: 0
8. MOVING OR SPEAKING SO SLOWLY THAT OTHER PEOPLE COULD HAVE NOTICED. OR THE OPPOSITE, BEING SO FIGETY OR RESTLESS THAT YOU HAVE BEEN MOVING AROUND A LOT MORE THAN USUAL: 0
4. FEELING TIRED OR HAVING LITTLE ENERGY: 3
2. FEELING DOWN, DEPRESSED OR HOPELESS: 3

## 2019-07-02 NOTE — TELEPHONE ENCOUNTER
Pt had an appt today with you. States you were going to order an EMG for her. Asking if you can put the order in epic? Pt will get it done at [facility].

## 2019-07-09 ENCOUNTER — HOSPITAL ENCOUNTER (OUTPATIENT)
Age: 39
Discharge: HOME | End: 2019-07-11
Payer: COMMERCIAL

## 2019-07-09 ENCOUNTER — HOSPITAL ENCOUNTER (OUTPATIENT)
Dept: GENERAL RADIOLOGY | Age: 39
Discharge: HOME | End: 2019-07-11
Payer: COMMERCIAL

## 2019-07-09 ENCOUNTER — HOSPITAL ENCOUNTER (OUTPATIENT)
Age: 39
Discharge: HOME | End: 2019-07-09
Payer: COMMERCIAL

## 2019-07-09 DIAGNOSIS — M54.2 NECK PAIN: ICD-10-CM

## 2019-07-09 DIAGNOSIS — R53.83 FATIGUE, UNSPECIFIED TYPE: ICD-10-CM

## 2019-07-09 DIAGNOSIS — Z00.00 HEALTH CARE MAINTENANCE: ICD-10-CM

## 2019-07-09 LAB
ABSOLUTE EOS #: 0.1 K/UL (ref 0–0.44)
ABSOLUTE IMMATURE GRANULOCYTE: <0.03 K/UL (ref 0–0.3)
ABSOLUTE LYMPH #: 1.64 K/UL (ref 1.1–3.7)
ABSOLUTE MONO #: 0.79 K/UL (ref 0.1–1.2)
ALBUMIN SERPL-MCNC: 4.1 G/DL (ref 3.5–5.2)
ALBUMIN/GLOBULIN RATIO: 1.1 (ref 1–2.5)
ALP BLD-CCNC: 91 U/L (ref 35–104)
ALT SERPL-CCNC: 9 U/L (ref 5–33)
ANION GAP SERPL CALCULATED.3IONS-SCNC: 10 MMOL/L (ref 9–17)
AST SERPL-CCNC: 12 U/L
BASOPHILS # BLD: 0 % (ref 0–2)
BASOPHILS ABSOLUTE: <0.03 K/UL (ref 0–0.2)
BILIRUB SERPL-MCNC: 0.38 MG/DL (ref 0.3–1.2)
BUN BLDV-MCNC: 13 MG/DL (ref 6–20)
BUN/CREAT BLD: NORMAL (ref 9–20)
CALCIUM SERPL-MCNC: 9.2 MG/DL (ref 8.6–10.4)
CHLORIDE BLD-SCNC: 102 MMOL/L (ref 98–107)
CHOLESTEROL/HDL RATIO: 3.2
CHOLESTEROL: 146 MG/DL
CO2: 26 MMOL/L (ref 20–31)
CREAT SERPL-MCNC: 0.59 MG/DL (ref 0.5–0.9)
DIFFERENTIAL TYPE: ABNORMAL
EOSINOPHILS RELATIVE PERCENT: 1 % (ref 1–4)
GFR AFRICAN AMERICAN: >60 ML/MIN
GFR NON-AFRICAN AMERICAN: >60 ML/MIN
GFR SERPL CREATININE-BSD FRML MDRD: NORMAL ML/MIN/{1.73_M2}
GFR SERPL CREATININE-BSD FRML MDRD: NORMAL ML/MIN/{1.73_M2}
GLUCOSE FASTING: 97 MG/DL (ref 70–99)
HCT VFR BLD CALC: 38.4 % (ref 36.3–47.1)
HDLC SERPL-MCNC: 46 MG/DL
HEMOGLOBIN: 12.1 G/DL (ref 11.9–15.1)
IMMATURE GRANULOCYTES: 0 %
LDL CHOLESTEROL: 78 MG/DL (ref 0–130)
LYMPHOCYTES # BLD: 23 % (ref 24–43)
MCH RBC QN AUTO: 30.6 PG (ref 25.2–33.5)
MCHC RBC AUTO-ENTMCNC: 31.5 G/DL (ref 28.4–34.8)
MCV RBC AUTO: 97 FL (ref 82.6–102.9)
MONOCYTES # BLD: 11 % (ref 3–12)
NRBC AUTOMATED: 0 PER 100 WBC
PDW BLD-RTO: 12.6 % (ref 11.8–14.4)
PLATELET # BLD: 334 K/UL (ref 138–453)
PLATELET ESTIMATE: ABNORMAL
PMV BLD AUTO: 10.2 FL (ref 8.1–13.5)
POTASSIUM SERPL-SCNC: 4.2 MMOL/L (ref 3.7–5.3)
RBC # BLD: 3.96 M/UL (ref 3.95–5.11)
RBC # BLD: ABNORMAL 10*6/UL
SEG NEUTROPHILS: 65 % (ref 36–65)
SEGMENTED NEUTROPHILS ABSOLUTE COUNT: 4.47 K/UL (ref 1.5–8.1)
SODIUM BLD-SCNC: 138 MMOL/L (ref 135–144)
TOTAL PROTEIN: 8 G/DL (ref 6.4–8.3)
TRIGL SERPL-MCNC: 112 MG/DL
TSH SERPL DL<=0.05 MIU/L-ACNC: 2 MIU/L (ref 0.3–5)
VLDLC SERPL CALC-MCNC: NORMAL MG/DL (ref 1–30)
WBC # BLD: 7 K/UL (ref 3.5–11.3)
WBC # BLD: ABNORMAL 10*3/UL

## 2019-07-09 PROCEDURE — 72040 X-RAY EXAM NECK SPINE 2-3 VW: CPT

## 2019-07-09 PROCEDURE — 84443 ASSAY THYROID STIM HORMONE: CPT

## 2019-07-09 PROCEDURE — 85025 COMPLETE CBC W/AUTO DIFF WBC: CPT

## 2019-07-09 PROCEDURE — 36415 COLL VENOUS BLD VENIPUNCTURE: CPT

## 2019-07-09 PROCEDURE — 80061 LIPID PANEL: CPT

## 2019-07-09 PROCEDURE — 80053 COMPREHEN METABOLIC PANEL: CPT

## 2019-07-29 DIAGNOSIS — F41.8 DEPRESSION WITH ANXIETY: ICD-10-CM

## 2019-07-29 RX ORDER — ESCITALOPRAM OXALATE 10 MG/1
10 TABLET ORAL DAILY
Qty: 90 TABLET | Refills: 1 | Status: SHIPPED | OUTPATIENT
Start: 2019-07-29 | End: 2019-08-01 | Stop reason: SDUPTHER

## 2019-07-29 NOTE — TELEPHONE ENCOUNTER
Last visit: 7/2/19  Last Med refill: 7/2/19  Does patient have enough medication for 72 hours:     Next Visit Date:  Future Appointments   Date Time Provider Raffi Francis   8/1/2019  8:00 AM RUDOLPH Ortiz [facility]   8/1/2019  6:00 PM Braulio Perkins, PT [facility] None   9/4/2019  4:00 PM [facility] EMG  [facility] None   9/4/2019  4:00 PM Adwoa Willard MD [facility] Maintenance   Topic Date Due    Flu vaccine (1) 09/01/2019    Potassium monitoring  07/09/2020    Creatinine monitoring  07/09/2020    Lipid screen  07/09/2024    DTaP/Tdap/Td vaccine (2 - Td) 08/22/2025    HIV screen  Completed    Pneumococcal 0-64 years Vaccine  Aged Out       Hemoglobin A1C (%)   Date Value   03/10/2018 5.5             ( goal A1C is < 7)   No results found for: LABMICR  LDL Cholesterol (mg/dL)   Date Value   07/09/2019 78     LDL Calculated (mg/dL)   Date Value   03/10/2018 98   03/21/2016 85       (goal LDL is <100)   AST (U/L)   Date Value   07/09/2019 12     ALT (U/L)   Date Value   07/09/2019 9     BUN (mg/dL)   Date Value   07/09/2019 13     BP Readings from Last 3 Encounters:   07/02/19 (!) 132/98   06/13/19 120/60   09/24/18 121/82          (goal 120/80)    All Future Testing planned in [facility]  Lab Frequency Next Occurrence   POCT Fecal Immunochemical Test (FIT) Once 10/25/2019   PAP SMEAR Once 06/13/2019   EMG Once 08/08/2019               Patient Active Problem List:     Vaginal high risk human papillomavirus (HPV) DNA test positive     Anemia     S/P subtotal hysterectomy     Second hand tobacco smoke exposure     Essential hypertension     JOSHUA (generalized anxiety disorder)     Mood disorder (HCC)     Depression with anxiety     Allergic rhinitis     Chronic daily headache     Fatigue     Neck pain

## 2019-08-01 ENCOUNTER — OFFICE VISIT (OUTPATIENT)
Dept: PRIMARY CARE CLINIC | Age: 39
End: 2019-08-01
Payer: COMMERCIAL

## 2019-08-01 ENCOUNTER — HOSPITAL ENCOUNTER (OUTPATIENT)
Dept: PHYSICAL THERAPY | Facility: CLINIC | Age: 39
Setting detail: THERAPIES SERIES
Discharge: HOME | End: 2019-08-01
Payer: COMMERCIAL

## 2019-08-01 VITALS
DIASTOLIC BLOOD PRESSURE: 88 MMHG | HEART RATE: 75 BPM | BODY MASS INDEX: 26.6 KG/M2 | OXYGEN SATURATION: 99 % | WEIGHT: 164.8 LBS | SYSTOLIC BLOOD PRESSURE: 118 MMHG

## 2019-08-01 DIAGNOSIS — R53.83 FATIGUE, UNSPECIFIED TYPE: ICD-10-CM

## 2019-08-01 DIAGNOSIS — F41.8 DEPRESSION WITH ANXIETY: ICD-10-CM

## 2019-08-01 DIAGNOSIS — M54.2 NECK PAIN: Primary | ICD-10-CM

## 2019-08-01 PROCEDURE — 1036F TOBACCO NON-USER: CPT | Performed by: PHYSICIAN ASSISTANT

## 2019-08-01 PROCEDURE — G8419 CALC BMI OUT NRM PARAM NOF/U: HCPCS | Performed by: PHYSICIAN ASSISTANT

## 2019-08-01 PROCEDURE — G8427 DOCREV CUR MEDS BY ELIG CLIN: HCPCS | Performed by: PHYSICIAN ASSISTANT

## 2019-08-01 PROCEDURE — 97110 THERAPEUTIC EXERCISES: CPT

## 2019-08-01 PROCEDURE — 97161 PT EVAL LOW COMPLEX 20 MIN: CPT

## 2019-08-01 PROCEDURE — 99214 OFFICE O/P EST MOD 30 MIN: CPT | Performed by: PHYSICIAN ASSISTANT

## 2019-08-01 RX ORDER — ESCITALOPRAM OXALATE 20 MG/1
20 TABLET ORAL DAILY
Qty: 90 TABLET | Refills: 0 | Status: SHIPPED | OUTPATIENT
Start: 2019-08-01 | End: 2019-09-04 | Stop reason: SDUPTHER

## 2019-08-01 ASSESSMENT — ENCOUNTER SYMPTOMS
WHEEZING: 0
EYE DISCHARGE: 0
VOMITING: 0
ABDOMINAL PAIN: 0
RHINORRHEA: 0
COUGH: 0
BACK PAIN: 1
EYE REDNESS: 0
SORE THROAT: 0
DIARRHEA: 0
SHORTNESS OF BREATH: 0
NAUSEA: 0

## 2019-08-01 NOTE — PROGRESS NOTES
Maternal Grandmother     Heart Disease Maternal Grandmother     Lung Cancer Paternal Grandfather     Diabetes Paternal Grandmother     Other Paternal Grandmother         brething problems    Depression Sister        Social History     Tobacco Use    Smoking status: Never Smoker    Smokeless tobacco: Never Used   Substance Use Topics    Alcohol use: Yes     Alcohol/week: 0.0 standard drinks     Frequency: Monthly or less     Comment: SOCIAL      Current Outpatient Medications   Medication Sig Dispense Refill    escitalopram (LEXAPRO) 20 MG tablet Take 1 tablet by mouth daily 90 tablet 0    Levocetirizine Dihydrochloride (XYZAL ALLERGY 24HR PO) Take by mouth      Fluticasone Furoate (FLONASE SENSIMIST NA) by Nasal route      omeprazole (PRILOSEC) 20 MG delayed release capsule Take 20 mg by mouth daily      celecoxib (CELEBREX) 200 MG capsule Take 1 capsule by mouth 2 times daily 60 capsule 0    Multiple Vitamins-Minerals (MULTI-B-PLUS PO) Take by mouth      cabergoline (DOSTINEX) 0.5 MG tablet        No current facility-administered medications for this visit. No Known Allergies    Health Maintenance   Topic Date Due    Flu vaccine (1) 09/01/2019    Potassium monitoring  07/09/2020    Creatinine monitoring  07/09/2020    Lipid screen  07/09/2024    DTaP/Tdap/Td vaccine (2 - Td) 08/22/2025    HIV screen  Completed    Pneumococcal 0-64 years Vaccine  Aged Out       Subjective:      Review of Systems   Constitutional: Positive for fatigue. Negative for chills and fever. HENT: Negative for rhinorrhea and sore throat. Eyes: Negative for discharge and redness. Respiratory: Negative for cough, shortness of breath and wheezing. Cardiovascular: Negative for chest pain and palpitations. Gastrointestinal: Negative for abdominal pain, diarrhea, nausea and vomiting. Genitourinary: Negative for dysuria and frequency. Musculoskeletal: Positive for back pain and neck pain.  Negative for

## 2019-08-08 ENCOUNTER — HOSPITAL ENCOUNTER (OUTPATIENT)
Dept: PHYSICAL THERAPY | Facility: CLINIC | Age: 39
Setting detail: THERAPIES SERIES
Discharge: HOME | End: 2019-08-08
Payer: COMMERCIAL

## 2019-08-08 PROCEDURE — 97110 THERAPEUTIC EXERCISES: CPT

## 2019-08-08 PROCEDURE — 97140 MANUAL THERAPY 1/> REGIONS: CPT

## 2019-08-12 ENCOUNTER — HOSPITAL ENCOUNTER (OUTPATIENT)
Dept: PHYSICAL THERAPY | Facility: CLINIC | Age: 39
Setting detail: THERAPIES SERIES
Discharge: HOME | End: 2019-08-12
Payer: COMMERCIAL

## 2019-08-12 PROCEDURE — 97110 THERAPEUTIC EXERCISES: CPT

## 2019-08-12 PROCEDURE — 97140 MANUAL THERAPY 1/> REGIONS: CPT

## 2019-08-15 ENCOUNTER — HOSPITAL ENCOUNTER (OUTPATIENT)
Dept: PHYSICAL THERAPY | Facility: CLINIC | Age: 39
Setting detail: THERAPIES SERIES
Discharge: HOME | End: 2019-08-15
Payer: COMMERCIAL

## 2019-08-15 NOTE — FLOWSHEET NOTE
[] [address]  [address]    [phone]  F: [phone]   [] [address]  [address] [facility]   [address]  P: [phone]  F: [phone]  [x] [address]  [address] [facility]  P: [phone]  F: [phone]   [] [address]  [facility] [address]   [place]: [phone]  F: [phone]  [] [place] [id number] [facility] [address]  [place]: [phone]   F: [phone]     Physical Therapy Cancel/No Show note    Date: 8/15/2019  Patient: Teresita Stern  : 1980  MRN: [id number]    Cancels/No Shows to date:     For today's appointment patient:    [x]  Cancelled    [] Rescheduled appointment    [] No-show     Reason given by patient:    []  Patient ill    []  Conflicting appointment    [] No transportation      [x] Conflict with work    [] No reason given    [] Weather related    [] Other:      Comments:        [] Next appointment was confirmed    Electronically signed by: Paul Quevedo PTA

## 2019-08-20 ENCOUNTER — HOSPITAL ENCOUNTER (OUTPATIENT)
Dept: PHYSICAL THERAPY | Facility: CLINIC | Age: 39
Setting detail: THERAPIES SERIES
Discharge: HOME | End: 2019-08-20
Payer: COMMERCIAL

## 2019-09-03 ENCOUNTER — HOSPITAL ENCOUNTER (OUTPATIENT)
Dept: PHYSICAL THERAPY | Facility: CLINIC | Age: 39
Setting detail: THERAPIES SERIES
Discharge: HOME | End: 2019-09-03
Payer: COMMERCIAL

## 2019-09-03 PROCEDURE — 97110 THERAPEUTIC EXERCISES: CPT

## 2019-09-03 PROCEDURE — 97140 MANUAL THERAPY 1/> REGIONS: CPT

## 2019-09-04 ENCOUNTER — HOSPITAL ENCOUNTER (OUTPATIENT)
Dept: NEUROLOGY | Age: 39
Discharge: HOME | End: 2019-09-04
Payer: COMMERCIAL

## 2019-09-04 DIAGNOSIS — F41.8 DEPRESSION WITH ANXIETY: ICD-10-CM

## 2019-09-04 PROCEDURE — 95911 NRV CNDJ TEST 9-10 STUDIES: CPT

## 2019-09-04 PROCEDURE — 95886 MUSC TEST DONE W/N TEST COMP: CPT

## 2019-09-04 RX ORDER — ESCITALOPRAM OXALATE 20 MG/1
20 TABLET ORAL DAILY
Qty: 90 TABLET | Refills: 3 | Status: SHIPPED | OUTPATIENT
Start: 2019-09-04 | End: 2020-04-17

## 2019-09-05 ENCOUNTER — HOSPITAL ENCOUNTER (OUTPATIENT)
Dept: PHYSICAL THERAPY | Facility: CLINIC | Age: 39
Setting detail: THERAPIES SERIES
Discharge: HOME | End: 2019-09-05
Payer: COMMERCIAL

## 2019-09-05 PROCEDURE — 97110 THERAPEUTIC EXERCISES: CPT

## 2019-09-10 ENCOUNTER — HOSPITAL ENCOUNTER (OUTPATIENT)
Dept: PHYSICAL THERAPY | Facility: CLINIC | Age: 39
Setting detail: THERAPIES SERIES
Discharge: HOME | End: 2019-09-10
Payer: COMMERCIAL

## 2019-09-10 PROCEDURE — 97110 THERAPEUTIC EXERCISES: CPT

## 2019-09-10 NOTE — FLOWSHEET NOTE
[] [address]  [address]  [phone]  F: [phone] [] [address]  [facility]   [address]  P: [phone]  F: [phone] [x] [place]  [address]  P: [phone]  F: [phone] [] [address]  [facility]   [address]  [place]: [phone]  F: [phone]     Physical Therapy Daily Treatment Note    Date:  9/10/2019  Patient Name:  Teresita Stern    :  1980  MRN: [id number]  Physician: Johanna March PA-C                                  Insurance: [facility] Diagnosis:   M54.2 (ICD-10-CM) - Neck pain           Rehab Codes: poor posture, MM weakness  Onset Date: 18                   Next 's appt. : tbd  Visit# / total visits:    Cancels/No Shows: 2/0    Subjective:    Pain:  [x] Yes  [] No Location: cervical Pain Rating: (0-10 scale) 4/10  Pain altered Tx:  [x] No  [] Yes  Action:  Comments: Pt mentions in general just feeling \"blah\" today, UT stiffness noted bilaterally. Objective:     Todays Treatment:  Modalities:   Precautions:  Manual: HYpervolt bilat thoracic paraspinals, periscapular - 5'    Exercises:  Exercise Reps/ Time Weight/ Level Comments    UBE 7'   x          Supine Cervical flexion 10x5\"        Upper trap stretch 3x30\"        Levator scap stretch 3x30\"        Scalenes stretch 3x30\"         Seated scapular retraction 20x3\"      Doorway stretch 3x30\" ea  90/90, \"Y\"            Prone scap retraction 20x3\"   x   Prone scap depression 20x3\"   x   Prone scap retract+ext 20x   x   Prone HAB 20x             Foam Roller thoracic rotation x3 ea direction  sitting    Foam roller laying 5mins   x   Foam Roller Reaching 2x10   x   Foam Roller circles x25   x   Foam roller scissors x25   x   Mid back series x20  plum bilaterally x          SL ER 20x  bilat            Tband HAB 20x  lime  x   Tband Bilat ER 20x  lime  x   Tband Rows 20x  plum  x   Other:     Specific Instructions for next treatment: Continue with program    Treatment Charges: Mins Units   []  Modalities     [x]  Ther Exercise 40 3   [x]  Manual Therapy     []  Ther Activities     []  Aquatics     []  Vasocompression     []  Other     Total Treatment time 40 3       Assessment: [x] Progressing toward goals. [] No change. [x] Other: Increased resistance with tband ex to continue to strengthen periscapular musculature for improved posture. Pt denies increased pain symptoms post ex. Good recall demonstrated. STG: (to be met in 6 treatments)  1. ? Pain: Report intermittent 4/10 pain  2. ? ROM: Patient to demonstrate 60 degrees R cervical rotation  3. ? Strength:  4. ? Function: Patient to demonstrate the ability to maintain neutral alignment  5. Independent with Home Exercise Programs  6. Demonstrate Knowledge of fall prevention  LTG: (to be met in 12 treatments)  1. Patient to report 0/10 pain with functional activities  2. Patient to demonstrate 4/5 posterior mm strength  3. Patient to report ability to complete functional activities without deficits on Neck disability index        Patient goals: To reduce pain    Pt. Education:  [x] Yes  [] No  [x] Reviewed Prior HEP/Ed  Method of Education: [x] Verbal  [] Demo  [] Written  Comprehension of Education:  [x] Verbalizes understanding. [x] Demonstrates understanding. [] Needs review. [] Demonstrates/verbalizes HEP/Ed previously given. Plan: [x] Continue per plan of care.    [] Other:      Time In: 6:00 PM            Time Out: 6:50 pm    Electronically signed by:  Gonsalo Jiménez PTA

## 2019-09-12 ENCOUNTER — OFFICE VISIT (OUTPATIENT)
Dept: PRIMARY CARE CLINIC | Age: 39
End: 2019-09-12
Payer: COMMERCIAL

## 2019-09-12 ENCOUNTER — HOSPITAL ENCOUNTER (OUTPATIENT)
Dept: PHYSICAL THERAPY | Facility: CLINIC | Age: 39
Setting detail: THERAPIES SERIES
Discharge: HOME | End: 2019-09-12
Payer: COMMERCIAL

## 2019-09-12 VITALS
WEIGHT: 168.2 LBS | DIASTOLIC BLOOD PRESSURE: 80 MMHG | HEART RATE: 72 BPM | OXYGEN SATURATION: 98 % | SYSTOLIC BLOOD PRESSURE: 124 MMHG | BODY MASS INDEX: 27.15 KG/M2

## 2019-09-12 DIAGNOSIS — R53.83 FATIGUE, UNSPECIFIED TYPE: Primary | ICD-10-CM

## 2019-09-12 DIAGNOSIS — G47.9 RESTLESS SLEEPER: ICD-10-CM

## 2019-09-12 DIAGNOSIS — G47.19 EXCESSIVE DAYTIME SLEEPINESS: ICD-10-CM

## 2019-09-12 DIAGNOSIS — R59.1 LYMPHADENOPATHY: ICD-10-CM

## 2019-09-12 DIAGNOSIS — F41.8 DEPRESSION WITH ANXIETY: ICD-10-CM

## 2019-09-12 DIAGNOSIS — Z23 NEED FOR VACCINATION: ICD-10-CM

## 2019-09-12 PROCEDURE — G8419 CALC BMI OUT NRM PARAM NOF/U: HCPCS | Performed by: PHYSICIAN ASSISTANT

## 2019-09-12 PROCEDURE — 90686 IIV4 VACC NO PRSV 0.5 ML IM: CPT | Performed by: PHYSICIAN ASSISTANT

## 2019-09-12 PROCEDURE — 90471 IMMUNIZATION ADMIN: CPT | Performed by: PHYSICIAN ASSISTANT

## 2019-09-12 PROCEDURE — G8427 DOCREV CUR MEDS BY ELIG CLIN: HCPCS | Performed by: PHYSICIAN ASSISTANT

## 2019-09-12 PROCEDURE — 99214 OFFICE O/P EST MOD 30 MIN: CPT | Performed by: PHYSICIAN ASSISTANT

## 2019-09-12 PROCEDURE — 1036F TOBACCO NON-USER: CPT | Performed by: PHYSICIAN ASSISTANT

## 2019-09-12 ASSESSMENT — ENCOUNTER SYMPTOMS
SHORTNESS OF BREATH: 0
NAUSEA: 0
BACK PAIN: 0
VOMITING: 0

## 2019-09-16 ENCOUNTER — HOSPITAL ENCOUNTER (OUTPATIENT)
Dept: PHYSICAL THERAPY | Facility: CLINIC | Age: 39
Setting detail: THERAPIES SERIES
Discharge: HOME | End: 2019-09-16
Payer: COMMERCIAL

## 2019-09-16 DIAGNOSIS — R20.0 NUMBNESS AND TINGLING IN BOTH HANDS: ICD-10-CM

## 2019-09-16 DIAGNOSIS — M54.2 NECK PAIN: ICD-10-CM

## 2019-09-16 DIAGNOSIS — R20.2 NUMBNESS AND TINGLING IN BOTH HANDS: ICD-10-CM

## 2019-09-16 PROCEDURE — 97110 THERAPEUTIC EXERCISES: CPT

## 2019-09-19 ENCOUNTER — APPOINTMENT (OUTPATIENT)
Dept: PHYSICAL THERAPY | Facility: CLINIC | Age: 39
End: 2019-09-19
Payer: COMMERCIAL

## 2019-09-27 ENCOUNTER — HOSPITAL ENCOUNTER (OUTPATIENT)
Dept: PHYSICAL THERAPY | Facility: CLINIC | Age: 39
Setting detail: THERAPIES SERIES
Discharge: HOME | End: 2019-09-27
Payer: COMMERCIAL

## 2019-09-27 PROCEDURE — 97110 THERAPEUTIC EXERCISES: CPT

## 2019-09-27 PROCEDURE — 97140 MANUAL THERAPY 1/> REGIONS: CPT

## 2019-10-04 ENCOUNTER — HOSPITAL ENCOUNTER (OUTPATIENT)
Age: 39
Setting detail: SPECIMEN
Discharge: HOME | End: 2019-10-04
Payer: COMMERCIAL

## 2019-10-04 ENCOUNTER — HOSPITAL ENCOUNTER (OUTPATIENT)
Dept: PHYSICAL THERAPY | Facility: CLINIC | Age: 39
Setting detail: THERAPIES SERIES
Discharge: HOME | End: 2019-10-04
Payer: COMMERCIAL

## 2019-10-04 ENCOUNTER — OFFICE VISIT (OUTPATIENT)
Dept: PRIMARY CARE CLINIC | Age: 39
End: 2019-10-04
Payer: COMMERCIAL

## 2019-10-04 VITALS
BODY MASS INDEX: 27.02 KG/M2 | DIASTOLIC BLOOD PRESSURE: 80 MMHG | HEART RATE: 77 BPM | OXYGEN SATURATION: 99 % | SYSTOLIC BLOOD PRESSURE: 124 MMHG | WEIGHT: 167.4 LBS

## 2019-10-04 DIAGNOSIS — R59.1 LYMPHADENOPATHY: ICD-10-CM

## 2019-10-04 DIAGNOSIS — R59.1 LYMPHADENOPATHY: Primary | ICD-10-CM

## 2019-10-04 LAB
ABSOLUTE EOS #: 0.09 K/UL (ref 0–0.44)
ABSOLUTE IMMATURE GRANULOCYTE: <0.03 K/UL (ref 0–0.3)
ABSOLUTE LYMPH #: 1.25 K/UL (ref 1.1–3.7)
ABSOLUTE MONO #: 0.54 K/UL (ref 0.1–1.2)
BASOPHILS # BLD: 0 % (ref 0–2)
BASOPHILS ABSOLUTE: <0.03 K/UL (ref 0–0.2)
DIFFERENTIAL TYPE: NORMAL
EOSINOPHILS RELATIVE PERCENT: 2 % (ref 1–4)
HCT VFR BLD CALC: 39.5 % (ref 36.3–47.1)
HEMOGLOBIN: 12.4 G/DL (ref 11.9–15.1)
IMMATURE GRANULOCYTES: 0 %
LYMPHOCYTES # BLD: 24 % (ref 24–43)
MCH RBC QN AUTO: 30.5 PG (ref 25.2–33.5)
MCHC RBC AUTO-ENTMCNC: 31.4 G/DL (ref 28.4–34.8)
MCV RBC AUTO: 97.1 FL (ref 82.6–102.9)
MONOCYTES # BLD: 10 % (ref 3–12)
NRBC AUTOMATED: 0 PER 100 WBC
PDW BLD-RTO: 12.9 % (ref 11.8–14.4)
PLATELET # BLD: 409 K/UL (ref 138–453)
PLATELET ESTIMATE: NORMAL
PMV BLD AUTO: 10.6 FL (ref 8.1–13.5)
RBC # BLD: 4.07 M/UL (ref 3.95–5.11)
RBC # BLD: NORMAL 10*6/UL
SEG NEUTROPHILS: 64 % (ref 36–65)
SEGMENTED NEUTROPHILS ABSOLUTE COUNT: 3.4 K/UL (ref 1.5–8.1)
WBC # BLD: 5.3 K/UL (ref 3.5–11.3)
WBC # BLD: NORMAL 10*3/UL

## 2019-10-04 PROCEDURE — G8482 FLU IMMUNIZE ORDER/ADMIN: HCPCS | Performed by: PHYSICIAN ASSISTANT

## 2019-10-04 PROCEDURE — G8427 DOCREV CUR MEDS BY ELIG CLIN: HCPCS | Performed by: PHYSICIAN ASSISTANT

## 2019-10-04 PROCEDURE — 99213 OFFICE O/P EST LOW 20 MIN: CPT | Performed by: PHYSICIAN ASSISTANT

## 2019-10-04 PROCEDURE — 1036F TOBACCO NON-USER: CPT | Performed by: PHYSICIAN ASSISTANT

## 2019-10-04 PROCEDURE — G8419 CALC BMI OUT NRM PARAM NOF/U: HCPCS | Performed by: PHYSICIAN ASSISTANT

## 2019-10-04 ASSESSMENT — ENCOUNTER SYMPTOMS
SORE THROAT: 0
CHOKING: 0
RHINORRHEA: 1
ABDOMINAL DISTENTION: 0
TROUBLE SWALLOWING: 0
EYES NEGATIVE: 1
COUGH: 0

## 2019-10-11 ENCOUNTER — HOSPITAL ENCOUNTER (OUTPATIENT)
Dept: PHYSICAL THERAPY | Facility: CLINIC | Age: 39
Setting detail: THERAPIES SERIES
Discharge: HOME | End: 2019-10-11
Payer: COMMERCIAL

## 2019-10-24 ENCOUNTER — HOSPITAL ENCOUNTER (OUTPATIENT)
Dept: SLEEP CENTER | Age: 39
Discharge: HOME | End: 2019-10-26
Payer: COMMERCIAL

## 2019-10-24 DIAGNOSIS — R53.83 FATIGUE, UNSPECIFIED TYPE: ICD-10-CM

## 2019-10-24 DIAGNOSIS — G47.9 RESTLESS SLEEPER: ICD-10-CM

## 2019-10-24 DIAGNOSIS — G47.19 EXCESSIVE DAYTIME SLEEPINESS: ICD-10-CM

## 2019-10-24 PROCEDURE — 95810 POLYSOM 6/> YRS 4/> PARAM: CPT

## 2019-10-29 ENCOUNTER — HOSPITAL ENCOUNTER (OUTPATIENT)
Dept: CT IMAGING | Age: 39
Discharge: HOME | End: 2019-10-31
Payer: COMMERCIAL

## 2019-10-29 DIAGNOSIS — I88.1 CHRONIC LYMPHADENITIS: ICD-10-CM

## 2019-10-29 DIAGNOSIS — E07.9 DISORDER OF THYROID GLAND: ICD-10-CM

## 2019-10-29 PROCEDURE — 6360000004 HC RX CONTRAST MEDICATION: Performed by: OTOLARYNGOLOGY

## 2019-10-29 PROCEDURE — 70491 CT SOFT TISSUE NECK W/DYE: CPT

## 2019-10-29 PROCEDURE — 2580000003 HC RX 258: Performed by: OTOLARYNGOLOGY

## 2019-10-29 RX ORDER — 0.9 % SODIUM CHLORIDE 0.9 %
80 INTRAVENOUS SOLUTION INTRAVENOUS ONCE
Status: COMPLETED | OUTPATIENT
Start: 2019-10-29 | End: 2019-10-29

## 2019-10-29 RX ORDER — SODIUM CHLORIDE 0.9 % (FLUSH) 0.9 %
10 SYRINGE (ML) INJECTION PRN
Status: DISCONTINUED | OUTPATIENT
Start: 2019-10-29 | End: 2019-11-01 | Stop reason: HOSPADM

## 2019-10-29 RX ADMIN — IOVERSOL 75 ML: 741 INJECTION INTRA-ARTERIAL; INTRAVENOUS at 17:22

## 2019-10-29 RX ADMIN — Medication 10 ML: at 17:22

## 2019-10-29 RX ADMIN — SODIUM CHLORIDE 80 ML: 9 INJECTION, SOLUTION INTRAVENOUS at 17:22

## 2019-11-15 LAB — STATUS: NORMAL

## 2019-11-21 ENCOUNTER — TELEPHONE (OUTPATIENT)
Dept: PRIMARY CARE CLINIC | Age: 39
End: 2019-11-21

## 2019-12-04 ENCOUNTER — TELEPHONE (OUTPATIENT)
Dept: PRIMARY CARE CLINIC | Age: 39
End: 2019-12-04

## 2019-12-12 ENCOUNTER — OFFICE VISIT (OUTPATIENT)
Dept: PRIMARY CARE CLINIC | Age: 39
End: 2019-12-12
Payer: COMMERCIAL

## 2019-12-12 VITALS
WEIGHT: 173.8 LBS | OXYGEN SATURATION: 98 % | BODY MASS INDEX: 27.93 KG/M2 | SYSTOLIC BLOOD PRESSURE: 130 MMHG | HEIGHT: 66 IN | DIASTOLIC BLOOD PRESSURE: 80 MMHG | HEART RATE: 55 BPM

## 2019-12-12 DIAGNOSIS — R53.83 FATIGUE, UNSPECIFIED TYPE: Primary | ICD-10-CM

## 2019-12-12 DIAGNOSIS — F41.1 GAD (GENERALIZED ANXIETY DISORDER): ICD-10-CM

## 2019-12-12 DIAGNOSIS — G47.19 EXCESSIVE DAYTIME SLEEPINESS: ICD-10-CM

## 2019-12-12 DIAGNOSIS — F39 MOOD DISORDER (HCC): ICD-10-CM

## 2019-12-12 DIAGNOSIS — F98.8 ATTENTION DEFICIT DISORDER (ADD) WITHOUT HYPERACTIVITY: ICD-10-CM

## 2019-12-12 PROBLEM — Z90.710 H/O: HYSTERECTOMY: Status: ACTIVE | Noted: 2018-03-06

## 2019-12-12 PROBLEM — J31.0 NON-ALLERGIC RHINITIS: Status: ACTIVE | Noted: 2019-12-12

## 2019-12-12 PROBLEM — R40.0 DAYTIME SOMNOLENCE: Status: ACTIVE | Noted: 2019-09-12

## 2019-12-12 PROBLEM — Z77.22 PASSIVE SMOKER: Status: ACTIVE | Noted: 2018-03-06

## 2019-12-12 PROCEDURE — 1036F TOBACCO NON-USER: CPT | Performed by: PHYSICIAN ASSISTANT

## 2019-12-12 PROCEDURE — G8427 DOCREV CUR MEDS BY ELIG CLIN: HCPCS | Performed by: PHYSICIAN ASSISTANT

## 2019-12-12 PROCEDURE — 99213 OFFICE O/P EST LOW 20 MIN: CPT | Performed by: PHYSICIAN ASSISTANT

## 2019-12-12 PROCEDURE — G8419 CALC BMI OUT NRM PARAM NOF/U: HCPCS | Performed by: PHYSICIAN ASSISTANT

## 2019-12-12 PROCEDURE — G8482 FLU IMMUNIZE ORDER/ADMIN: HCPCS | Performed by: PHYSICIAN ASSISTANT

## 2019-12-12 ASSESSMENT — ENCOUNTER SYMPTOMS
COUGH: 0
RHINORRHEA: 0
ABDOMINAL PAIN: 0
EYE DISCHARGE: 0
DIARRHEA: 0
EYE REDNESS: 0
WHEEZING: 0
NAUSEA: 0
VOMITING: 0
SHORTNESS OF BREATH: 0
SORE THROAT: 0

## 2020-03-24 RX ORDER — CELECOXIB 200 MG/1
CAPSULE ORAL
Qty: 180 CAPSULE | Refills: 1 | Status: SHIPPED | OUTPATIENT
Start: 2020-03-24 | End: 2021-03-16 | Stop reason: SDUPTHER

## 2020-04-17 ENCOUNTER — TELEPHONE (OUTPATIENT)
Dept: PRIMARY CARE CLINIC | Age: 40
End: 2020-04-17

## 2020-04-17 ENCOUNTER — TELEMEDICINE (OUTPATIENT)
Dept: PRIMARY CARE CLINIC | Age: 40
End: 2020-04-17
Payer: COMMERCIAL

## 2020-04-17 VITALS — HEIGHT: 66 IN | BODY MASS INDEX: 27.32 KG/M2 | WEIGHT: 170 LBS | HEART RATE: 79 BPM | TEMPERATURE: 98.4 F

## 2020-04-17 PROCEDURE — 99213 OFFICE O/P EST LOW 20 MIN: CPT | Performed by: PHYSICIAN ASSISTANT

## 2020-04-17 PROCEDURE — G8427 DOCREV CUR MEDS BY ELIG CLIN: HCPCS | Performed by: PHYSICIAN ASSISTANT

## 2020-04-17 RX ORDER — MOXIFLOXACIN 5 MG/ML
1 SOLUTION/ DROPS OPHTHALMIC 3 TIMES DAILY
Qty: 1 BOTTLE | Refills: 0 | Status: SHIPPED | OUTPATIENT
Start: 2020-04-17 | End: 2020-04-24

## 2020-04-17 ASSESSMENT — ENCOUNTER SYMPTOMS
EYE DISCHARGE: 1
EYE ITCHING: 1
EYE REDNESS: 1
EYE PAIN: 0
COUGH: 0
ABDOMINAL PAIN: 0
SHORTNESS OF BREATH: 0

## 2020-04-17 NOTE — PROGRESS NOTES
[facility]  [address]  Phone: [phone]  Fax: [phone]    Jose Fletcher is a 41 y.o. female who presents today for her medical conditions/complaintsas noted below. Chief Complaint   Patient presents with    Conjunctivitis     patient's left eye is red,itchy and burns. Patient said she woke up and her left eye was crusted shut. Patient said she woke up with sx today.  Other     pt consents to telehealth services. HPI:     HPI   Visit took place with Anastasiia Hein at her home with  in background audio and myself. Starting today patient has crusting in left eye. It is red and itchy. Denies any injury and no exposure to something getting into eye. Does wear contacts and has glasses on now. No new eye product or any  eye product. Has some allergy symptoms now and is taking her Flonase and Xyzal.  NO fever or other ill symptoms and no rash. It is affecting only her left eye. No one else has this around her. Current Outpatient Medications   Medication Sig Dispense Refill    moxifloxacin (VIGAMOX) 0.5 % ophthalmic solution Place 1 drop into both eyes 3 times daily for 7 days 1 Bottle 0    celecoxib (CELEBREX) 200 MG capsule TAKE 1 CAPSULE BY MOUTH TWICE DAILY 180 capsule 1    Levocetirizine Dihydrochloride (XYZAL ALLERGY 24HR PO) Take by mouth      Fluticasone Furoate (FLONASE SENSIMIST NA) by Nasal route      Multiple Vitamins-Minerals (MULTI-B-PLUS PO) Take by mouth      cabergoline (DOSTINEX) 0.5 MG tablet        No current facility-administered medications for this visit. No Known Allergies    Subjective:      Review of Systems   Constitutional: Negative for chills, diaphoresis and fever. HENT: Positive for congestion and postnasal drip. Eyes: Positive for discharge, redness and itching. Negative for pain. Respiratory: Negative for cough and shortness of breath.     Cardiovascular: Negative for

## 2020-04-17 NOTE — TELEPHONE ENCOUNTER
Patient called stating she has pink eye in Left eye and right eye is starting to become irritated. Experiencing Redness and burning. She is asking if something could be called into her pharmacy on file as soon as possible. Please contact her to advise. Thank you.

## 2020-06-23 ENCOUNTER — HOSPITAL ENCOUNTER (OUTPATIENT)
Dept: MAMMOGRAPHY | Age: 40
Discharge: HOME | End: 2020-06-25
Payer: COMMERCIAL

## 2020-06-23 ENCOUNTER — PATIENT MESSAGE (OUTPATIENT)
Dept: OBGYN CLINIC | Age: 40
End: 2020-06-23

## 2020-06-23 PROCEDURE — 77063 BREAST TOMOSYNTHESIS BI: CPT

## 2020-06-30 ENCOUNTER — HOSPITAL ENCOUNTER (OUTPATIENT)
Dept: WOMENS IMAGING | Age: 40
Discharge: HOME | End: 2020-07-02
Payer: COMMERCIAL

## 2020-06-30 PROCEDURE — 76642 ULTRASOUND BREAST LIMITED: CPT

## 2020-06-30 PROCEDURE — G0279 TOMOSYNTHESIS, MAMMO: HCPCS

## 2020-07-06 ENCOUNTER — TELEPHONE (OUTPATIENT)
Dept: OBGYN CLINIC | Age: 40
End: 2020-07-06

## 2020-07-06 NOTE — TELEPHONE ENCOUNTER
----- Message from PADMINI Dhillon CNP sent at 7/1/2020  6:15 PM EDT -----  US and mammogram show likely cyst repeat 6 months US and mammogram I will place orders please notify pt

## 2020-07-22 ENCOUNTER — OFFICE VISIT (OUTPATIENT)
Dept: OBGYN CLINIC | Age: 40
End: 2020-07-22
Payer: COMMERCIAL

## 2020-07-22 ENCOUNTER — HOSPITAL ENCOUNTER (OUTPATIENT)
Age: 40
Setting detail: SPECIMEN
Discharge: HOME | End: 2020-07-22
Payer: COMMERCIAL

## 2020-07-22 VITALS — BODY MASS INDEX: 26.79 KG/M2 | DIASTOLIC BLOOD PRESSURE: 72 MMHG | WEIGHT: 166 LBS | SYSTOLIC BLOOD PRESSURE: 118 MMHG

## 2020-07-22 PROCEDURE — 99396 PREV VISIT EST AGE 40-64: CPT | Performed by: NURSE PRACTITIONER

## 2020-07-22 RX ORDER — FLUTICASONE PROPIONATE 50 MCG
SPRAY, SUSPENSION (ML) NASAL
COMMUNITY
Start: 2020-06-05 | End: 2020-12-08 | Stop reason: ALTCHOICE

## 2020-07-22 ASSESSMENT — ENCOUNTER SYMPTOMS
ABDOMINAL PAIN: 0
COUGH: 0
COLOR CHANGE: 0
CONSTIPATION: 0
NAUSEA: 0
SHORTNESS OF BREATH: 0
BACK PAIN: 0
VOMITING: 0
RHINORRHEA: 0
DIARRHEA: 0

## 2020-07-22 NOTE — PATIENT INSTRUCTIONS
Patient Education      Patient Education        Learning About Breast Cancer Screening  What is breast cancer screening? Breast cancer occurs when cells that are not normal grow in one or both of your breasts. Screening tests can help find breast cancer early. Cancer is easier to treat when it's found early. Having concerns about breast cancer is common. That's why it's important to talk with your doctor about when to start and how often to get screened for breast cancer. How is breast cancer screening done? Several screening tests can be used to check for breast cancer. Mammograms. These tests check for signs of cancer using X-rays. They can show tumors that are too small for you or your doctor to feel. During a mammogram, a machine squeezes your breasts to make them flatter and easier to X-ray. At least two pictures are taken of each breast. One is taken from the top and one from the side. 3-D mammograms. These tests are also called digital breast tomosynthesis. Your breast is positioned on a flat plate. A top plate is pressed against your breast to keep it in position. The X-ray arm then moves in an arc above the breast and takes many pictures. A computer uses these X-rays to create a three-dimensional image. Clinical breast exam.   In this exam, your doctor carefully feels your breasts and under your arms to check for lumps or other changes. When should you get screened? Talk with your doctor about when you should start being tested for breast cancer. How often you get tested and the kind of tests you get will depend on your age and your risk. The guidelines that follow are for women who have an average risk for breast cancer. If you have a higher risk, such as having a family history of breast cancer in multiple relatives or at a young age, your doctor may recommend different screening for you.   · Ages 21 to 44: Some experts recommend that women have a clinical breast exam every 1 to 3 years, starting at age 22. Ask your doctor how often you should have this test. If you have a high risk for breast cancer, talk with your doctor about the best schedule and tests for you. · Ages 36 and older: Talk with your doctor about how often you should have mammograms and clinical breast exams. What is your risk for breast cancer? If you don't already know your risk of breast cancer, you can ask your doctor about it. You can also look it up at www.cancer.gov/bcrisktool/. If your doctor says that you have a high or very high risk, ask about ways to reduce your risk. These could include getting extra screening, taking medicine, or having surgery. If you have a strong family history of breast cancer, ask your doctor about genetic testing. What steps can you take to stay healthy? Some things that increase your risk of breast cancer, such as your age and being female, cannot be controlled. But you can do some things to stay as healthy as you can. · Learn what your breasts normally look and feel like. If you notice any changes, tell your doctor. · If you drink alcohol, limit how much you drink. Any amount of alcohol may increase your risk for some types of cancer. · If you smoke, quit. When you quit smoking, you lower your chances of getting many types of cancer. You can also do your best to eat well, be active, and stay at a healthy weight. Eating healthy foods and being active every day, as well as staying at a healthy weight, may help prevent cancer. Where can you learn more? Go to https://BlendbonnieNuAx.Unified. org and sign in to your [employer] account. Enter B058 in the [facility] box to learn more about \"Learning About Breast Cancer Screening. \"     If you do not have an account, please click on the \"Sign Up Now\" link. Current as of: August 22, 2019               Content Version: 12.5  © 6734-6389 Healthwise, Incorporated. Care instructions adapted under license by [facility] ([facility]).  If you have questions about a medical condition or this instruction, always ask your healthcare professional. [address] any warranty or liability for your use of this information. Pap Test: Care Instructions  Your Care Instructions     The Pap test (also called a Pap smear) is a screening test for cancer of the cervix, which is the lower part of the uterus that opens into the vagina. The test can help your doctor find early changes in the cells that could lead to cancer. The sample of cells taken during your test has been sent to a lab so that an expert can look at the cells. It usually takes a week or two to get the results back. Follow-up care is a key part of your treatment and safety. Be sure to make and go to all appointments, and call your doctor if you are having problems. It's also a good idea to know your test results and keep a list of the medicines you take. What do the results mean? · A normal result means that the test did not find any abnormal cells in the sample. · An abnormal result can mean many things. Most of these are not cancer. The results of your test may be abnormal because:  ? You have an infection of the vagina or cervix, such as a yeast infection. ? You have an IUD (intrauterine device for birth control). ? You have low estrogen levels after menopause that are causing the cells to change. ? You have cell changes that may be a sign of precancer or cancer. The results are ranked based on how serious the changes might be. There are many other reasons why you might not get a normal result. If the results were abnormal, you may need to get another test within a few weeks or months. If the results show changes that could be a sign of cancer, you may need a test called a colposcopy, which provides a more complete view of the cervix. Sometimes the lab cannot use the sample because it does not contain enough cells or was not preserved well.  If so, you may need to have the test again. This is not common, but it does happen from time to time. When should you call for help? Watch closely for changes in your health, and be sure to contact your doctor if:  · You have vaginal bleeding or pain for more than 2 days after the test. It is normal to have a small amount of bleeding for a day or two after the test.  Where can you learn more? Go to https://LetsCrampeHive guard unlimitedeweb.[employer]Power Global. org and sign in to your [employer] account. Enter Q224 in the [employer] box to learn more about \"Pap Test: Care Instructions. \"     If you do not have an account, please click on the \"Sign Up Now\" link. Current as of: August 22, 2019               Content Version: 12.5  © 9727-4947 Healthwise, Incorporated. Care instructions adapted under license by [facility] ([facility]). If you have questions about a medical condition or this instruction, always ask your healthcare professional. [place] [address] any warranty or liability for your use of this information.

## 2020-07-22 NOTE — PROGRESS NOTES
Corbin Pond is a 41 y.o.  here for her annual exam.  The patient was seen and examined. The patients past medical, surgical, social and family history were reviewed. Current medications and allergies were reviewed, and documented in the chart. She is employed through [employer]. She is  with 2 children    Exercise Yes  Diet Yes  Tobacco abuse No    Last PAP: 2019- negative  Had vaginal hysterectomy in  for AUB, no hx of abnormal paps prior to hysterectomy. 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 Pap smears in 2014, 2014, 2015 and 2016 showed low-grade squamous intraepithelial lesion. Vaginal biopsy in 2016- mild dysplasia. PAP in [place] 2017 ASCUS with + HR HPV other vaginal biopsy at that time negative. She last saw gynecology oncologist Dr. Zoe Myers in May 2018 and said if that negative cytology and negative HPV could follow with gynecologist for follow up pap in a year. Family hx uterine or ovarian cancer-denies  She had diagnostic mammogram and US right breast in 2020- showed likely cyst of right breast recommended repeat US and mammogram in 6 months orders have already been placed. Family hx of breast cancer -maternal aunt at age 52 BRCA. States was told not to ever take HRT   family hx colon cancer -denies        Sexually active: yes - with , multiple partners: No, Dyspareunia: No, Vaginal discharge: no,  UTI symptoms: no, voiding difficulties: no, bowels regular:No hx IBS- constipation, bloating:no      Menstrual history:  HYST  With ovaries preserved in  for AUB     Intermittently will notice right sided pelvic pain for months, states will be sharp at times. She denies dyspareunia or vaginal discharge or odor. She states she has had hx of ovarian cysts in past.  She states sometimes notices more when she is constipated with her IBS.      OB History    Para Term  AB Living   2 2 0     2   SAB TAB Ectopic Molar Multiple Live Births                    # Outcome Date GA Lbr Ammon/2nd Weight Sex Delivery Anes PTL Lv   2 Para      Vag-Spont      1 Para      Vag-Spont          Vitals:    20 1636   BP: 118/72   Site: Right Upper Arm   Position: Sitting   Cuff Size: Medium Adult   Weight: 166 lb (75.3 kg)       Wt Readings from Last 3 Encounters:   20 166 lb (75.3 kg)   20 170 lb (77.1 kg)   19 173 lb 12.8 oz (78.8 kg)     Past Medical History:   Diagnosis Date    ADHD (attention deficit hyperactivity disorder)     Allergic rhinitis     Anxiety     Depression     High risk HPV infection 14    LGSIL Pap smear of vagina 14    Pituitary disorder (HCC)     pituitary tumor    PTSD (post-traumatic stress disorder)     PTSD (post-traumatic stress disorder)     Vaginal high risk human papillomavirus (HPV) DNA test positive     Cuff; s/p vag hyst                                                                   Past Surgical History:   Procedure Laterality Date    COLPOSCOPY  14    COLPOSCOPY  2015    due to lgsil on pap    HYSTERECTOMY, VAGINAL      OTHER SURGICAL HISTORY      Septaplasty    THROAT SURGERY      cyst from throat     Family History   Problem Relation Age of Onset    Other Father         cholesterol    Heart Attack Father     Other Mother         low salt    Lupus Mother     Diabetes Maternal Grandmother     Heart Disease Maternal Grandmother     Lung Cancer Paternal Grandfather     Diabetes Paternal Grandmother     Other Paternal Grandmother         brething problems    Depression Sister      Social History     Tobacco Use   Smoking Status Never Smoker   Smokeless Tobacco Never Used     Social History     Substance and Sexual Activity   Alcohol Use Yes    Alcohol/week: 0.0 standard drinks    Frequency: Monthly or less    Comment: SOCIAL        Social History     Tobacco History Smoking Status  Never Smoker    Smokeless Tobacco Use  Never Used          Alcohol History     Alcohol Use Status  Yes Drinks/Week  0 Standard drinks or equivalent per week Amount  0.0 standard drinks of alcohol/wk Comment  SOCIAL          Drug Use     Drug Use Status  No          Sexual Activity     Sexually Active  Yes Partners  Male Birth Control/Protection  Surgical Comment  VASECTOMY AND HYSTERECTOMY              No Known Allergies  Current Outpatient Medications   Medication Sig Dispense Refill    fluticasone (FLONASE) 50 MCG/ACT nasal spray       celecoxib (CELEBREX) 200 MG capsule TAKE 1 CAPSULE BY MOUTH TWICE DAILY 180 capsule 1    Levocetirizine Dihydrochloride (XYZAL ALLERGY 24HR PO) Take by mouth      Fluticasone Furoate (FLONASE SENSIMIST NA) by Nasal route      Multiple Vitamins-Minerals (MULTI-B-PLUS PO) Take by mouth      cabergoline (DOSTINEX) 0.5 MG tablet        No current facility-administered medications for this visit. Subjective:     Review of Systems   Constitutional: Negative for chills, fatigue, fever and unexpected weight change. HENT: Negative for congestion and rhinorrhea. Eyes: Negative for visual disturbance. Respiratory: Negative for cough and shortness of breath. Cardiovascular: Negative for chest pain, palpitations and leg swelling. Gastrointestinal: Negative for abdominal pain, constipation, diarrhea, nausea and vomiting. Endocrine: Negative for cold intolerance, heat intolerance, polydipsia and polyuria. Genitourinary: Positive for pelvic pain. Negative for dyspareunia, dysuria, flank pain, vaginal bleeding, vaginal discharge and vaginal pain. Musculoskeletal: Negative for back pain and myalgias. Skin: Negative for color change and rash. Neurological: Negative for dizziness, light-headedness and headaches. Hematological: Negative for adenopathy. Does not bruise/bleed easily.    Psychiatric/Behavioral: Negative for self-injury and suicidal ideas.       Objective:     Physical Exam  Vitals signs and nursing note reviewed. Constitutional:       General: She is not in acute distress. Appearance: She is well-developed. She is not diaphoretic. HENT:      Head: Normocephalic and atraumatic. Right Ear: External ear normal.      Left Ear: External ear normal.      Nose: Nose normal.   Eyes:      Pupils: Pupils are equal, round, and reactive to light. Neck:      Musculoskeletal: Normal range of motion and neck supple. Thyroid: No thyromegaly. Cardiovascular:      Rate and Rhythm: Normal rate and regular rhythm. Heart sounds: Normal heart sounds. No murmur. No friction rub. No gallop. Comments: No bilateral calf tenderness or swelling  Pulmonary:      Effort: Pulmonary effort is normal. No respiratory distress. Breath sounds: Normal breath sounds. No wheezing. Abdominal:      General: Bowel sounds are normal.      Palpations: Abdomen is soft. Tenderness: There is no abdominal tenderness. Genitourinary:     Comments: Breasts nipples everted, no masses or tenderness, does BSE  Vulva-no lesions  Vagina-pink rugated    Adnexa-no masses or tenderness   Musculoskeletal: Normal range of motion. Lymphadenopathy:      Cervical: No cervical adenopathy. Skin:     General: Skin is warm and dry. Findings: No rash. Neurological:      Mental Status: She is alert and oriented to person, place, and time. Cranial Nerves: No cranial nerve deficit. Deep Tendon Reflexes: Reflexes are normal and symmetric. Psychiatric:         Behavior: Behavior normal.         Thought Content: Thought content normal.         Judgment: Judgment normal.       /72 (Site: Right Upper Arm, Position: Sitting, Cuff Size: Medium Adult)   Wt 166 lb (75.3 kg)   BMI 26.79 kg/m²     Assessment:       Diagnosis Orders   1. Visit for gynecologic examination  PAP SMEAR   2.  Pelvic pain  US PELVIS COMPLETE    US NON OB TRANSVAGINAL Breast exam completed. Pelvic exam pap smear collected and sent. Cultures sent No    Plan:   Collect pap   BSE reviewed, Mammogram as noted in 6 months  STD prevention reviewed  Intermittent pelvic pain-  Cultures declined   Check pelvic US. Diet & Exercise reviewed with pt. Preventive  Health through PCP   RV prn/annual           Orders Placed This Encounter   Procedures    US PELVIS COMPLETE     Standing Status:   Future     Standing Expiration Date:   7/22/2021    US NON OB TRANSVAGINAL     Begin with transabdominal imaging. Standing Status:   Future     Standing Expiration Date:   7/22/2021    PAP SMEAR     Patient History:    No LMP recorded. Patient has had a hysterectomy. OBGYN Status: Hysterectomy  Past Surgical History:  6/26/14: COLPOSCOPY  2/12/2015: COLPOSCOPY      Comment:  due to lgsil on pap  No date: HYSTERECTOMY, VAGINAL  No date: OTHER SURGICAL HISTORY      Comment:  Septaplasty  1980: THROAT SURGERY      Comment:  cyst from throat    Problem List       Edg Problems Affecting Cytology    H/O: hysterectomy     Social History    Tobacco Use      Smoking status: Never Smoker      Smokeless tobacco: Never Used       Standing Status:   Future     Standing Expiration Date:   7/23/2021     Order Specific Question:   Collection Type     Answer: Thin Prep     Order Specific Question:   Prior Abnormal Pap Test     Answer:   No     Order Specific Question:   Screening or Diagnostic     Answer:   Screening     Order Specific Question:   HPV Requested? Answer:   Yes     Order Specific Question:   High Risk Patient     Answer:   N/A     No orders of the defined types were placed in this encounter. Patient given educational materials - seepatient instructions. Discussed use, benefit, and side effects of prescribed medications. All patient questions answered. Pt voiced understanding. Reviewed health maintenance. Instructed to continue current medications, diet and exercise.

## 2020-07-29 RX ORDER — FLUCONAZOLE 150 MG/1
150 TABLET ORAL ONCE
Qty: 1 TABLET | Refills: 0 | Status: SHIPPED | OUTPATIENT
Start: 2020-07-29 | End: 2020-07-29

## 2020-08-05 ENCOUNTER — HOSPITAL ENCOUNTER (OUTPATIENT)
Dept: ULTRASOUND IMAGING | Age: 40
Discharge: HOME | End: 2020-08-07
Payer: COMMERCIAL

## 2020-08-05 PROCEDURE — 76830 TRANSVAGINAL US NON-OB: CPT

## 2020-08-05 PROCEDURE — 76856 US EXAM PELVIC COMPLETE: CPT

## 2020-08-11 ENCOUNTER — HOSPITAL ENCOUNTER (OUTPATIENT)
Age: 40
Discharge: HOME | End: 2020-08-11
Payer: COMMERCIAL

## 2020-08-11 ENCOUNTER — OFFICE VISIT (OUTPATIENT)
Dept: NEUROLOGY | Age: 40
End: 2020-08-11
Payer: COMMERCIAL

## 2020-08-11 VITALS
TEMPERATURE: 98.1 F | BODY MASS INDEX: 26.52 KG/M2 | SYSTOLIC BLOOD PRESSURE: 124 MMHG | DIASTOLIC BLOOD PRESSURE: 86 MMHG | WEIGHT: 165 LBS | HEIGHT: 66 IN | HEART RATE: 83 BPM | OXYGEN SATURATION: 92 %

## 2020-08-11 LAB — VITAMIN D 25-HYDROXY: 38.7 NG/ML (ref 30–100)

## 2020-08-11 PROCEDURE — 1036F TOBACCO NON-USER: CPT | Performed by: STUDENT IN AN ORGANIZED HEALTH CARE EDUCATION/TRAINING PROGRAM

## 2020-08-11 PROCEDURE — 99205 OFFICE O/P NEW HI 60 MIN: CPT | Performed by: STUDENT IN AN ORGANIZED HEALTH CARE EDUCATION/TRAINING PROGRAM

## 2020-08-11 PROCEDURE — G8427 DOCREV CUR MEDS BY ELIG CLIN: HCPCS | Performed by: STUDENT IN AN ORGANIZED HEALTH CARE EDUCATION/TRAINING PROGRAM

## 2020-08-11 PROCEDURE — G8419 CALC BMI OUT NRM PARAM NOF/U: HCPCS | Performed by: STUDENT IN AN ORGANIZED HEALTH CARE EDUCATION/TRAINING PROGRAM

## 2020-08-11 PROCEDURE — 36415 COLL VENOUS BLD VENIPUNCTURE: CPT

## 2020-08-11 PROCEDURE — 82306 VITAMIN D 25 HYDROXY: CPT

## 2020-08-11 RX ORDER — DIPHENHYDRAMINE HCL 25 MG
25 CAPSULE ORAL 2 TIMES DAILY PRN
Qty: 28 CAPSULE | Refills: 2 | Status: ON HOLD | OUTPATIENT
Start: 2020-08-11 | End: 2021-11-24

## 2020-08-11 RX ORDER — PROMETHAZINE HYDROCHLORIDE 12.5 MG/1
12.5 TABLET ORAL EVERY 8 HOURS PRN
Qty: 14 TABLET | Refills: 2 | Status: ON HOLD | OUTPATIENT
Start: 2020-08-11 | End: 2021-11-24

## 2020-08-11 RX ORDER — ACETAMINOPHEN, ASPIRIN AND CAFFEINE 250; 250; 65 MG/1; MG/1; MG/1
2 TABLET, FILM COATED ORAL PRN
Qty: 28 TABLET | Refills: 2 | Status: ON HOLD | OUTPATIENT
Start: 2020-08-11 | End: 2021-11-24

## 2020-08-11 RX ORDER — NAPROXEN SODIUM 550 MG/1
550 TABLET ORAL 2 TIMES DAILY PRN
Qty: 14 TABLET | Refills: 3 | Status: SHIPPED | OUTPATIENT
Start: 2020-08-11 | End: 2020-12-08 | Stop reason: ALTCHOICE

## 2020-08-11 RX ORDER — CALCIUM CARBONATE/VITAMIN D3 500-10/5ML
400 LIQUID (ML) ORAL DAILY
Qty: 30 CAPSULE | Refills: 3 | Status: SHIPPED | OUTPATIENT
Start: 2020-08-11 | End: 2020-09-10

## 2020-08-11 ASSESSMENT — ENCOUNTER SYMPTOMS
PHOTOPHOBIA: 1
NAUSEA: 0
EYE DISCHARGE: 0
ABDOMINAL PAIN: 0
SORE THROAT: 0
EYE PAIN: 0
SINUS PAIN: 0
CONSTIPATION: 0
EYE REDNESS: 0
SHORTNESS OF BREATH: 0
COUGH: 0
VOMITING: 0
DIARRHEA: 0

## 2020-08-11 NOTE — PROGRESS NOTES
[address] [place] [address],  [address], [facility], [facility], [address] [place] [address]  P: [phone]  F: [phone]    NEUROLOGY CLINIC NOTE     PATIENT NAME: Carmen eLo  PATIENT MRN: [id number]  PRIMARY CARE PHYSICIAN: Lou Dominguez PA-C    HPI:      Carmen Leo is a 41 y.o. right handed  female with PMH significant for pituitary macroadenoma, PTSD, depression, ADHD, anxiety was seen in the clinic for headaches    History obtained from Patient     Headaches  Headaches started in her early 25s, gradually getting worse for last 7 to 8 months. She endorses headache mainly located at the top of the head, starts as a dull pain, throbbing/pulsating in nature, occurring almost daily, severe headaches occur once or twice in a week, 4-8/10 intensity, usually gradual in onset, takes around 2 to 3 hours to build up and last for few hours to whole day. Denies any warning signs prior to the onset of the headache. Endorses nausea, photophobia, sometimes phonophobia, changes in the speech, dizziness, fatigue and watering from the eyes. Denies any phonophobia or visual changes or focal tingling numbness or muscle weakness or mood changes or personality changes or lightheadedness or redness in the eyes or watering from the nose or facial sweating.     Headaches are typically triggered by stress, skipping meals, hunger , fasting, bright lights and sunlight     Headaches are relieved by OTC NSAIDs and lying in a darkened room    Headache free days per month: 2-3  Cutaneous allodynia: yes - always  History of:  Family history of headaches or migraines: no  Renal stones: no   Motion sickness:  no  Head/Neck Trauma: no  Stressors: no  Sleep: 6  Hours,            Difficulty in initiating or maintaining sleep: yes -both,              Snoring: no  Psych/Mood: Appropriate  Caffeine: Oz coffee per day    Headache Medications  Current abortive meds: Tylenol, Motrin, Excedrin Migraine, Benadryl  Current prophylactic meds: None  Previous abortive medications tried: OTC pain medications  Previous prophylactic medications tried: None                                                                         PATIENT HISTORY:     Past Medical History:   Diagnosis Date    ADHD (attention deficit hyperactivity disorder)     Allergic rhinitis     Anxiety     Depression     High risk HPV infection 4/29/14    LGSIL Pap smear of vagina 4/29/14    Pituitary disorder (HCC)     pituitary tumor    PTSD (post-traumatic stress disorder)     PTSD (post-traumatic stress disorder)     Vaginal high risk human papillomavirus (HPV) DNA test positive     Cuff; s/p vag hyst        Past Surgical History:   Procedure Laterality Date    COLPOSCOPY  6/26/14    COLPOSCOPY  2/12/2015    due to lgsil on pap    HYSTERECTOMY, VAGINAL      OTHER SURGICAL HISTORY      Septaplasty    THROAT SURGERY  1980    cyst from throat        Social History     Socioeconomic History    Marital status:      Spouse name: Not on file    Number of children: Not on file    Years of education: Not on file    Highest education level: Not on file   Occupational History    Occupation: SCHEDULING   Social Needs    Financial resource strain: Not on file    Food insecurity     Worry: Not on file     Inability: Not on file   [employer] needs     Medical: Not on file     Non-medical: Not on file   Tobacco Use    Smoking status: Never Smoker    Smokeless tobacco: Never Used   Substance and Sexual Activity    Alcohol use:  Yes     Alcohol/week: 0.0 standard drinks     Frequency: Monthly or less     Comment: SOCIAL    Drug use: No    Sexual activity: Yes     Partners: Male     Birth control/protection: Surgical     Comment: VASECTOMY AND HYSTERECTOMY   Lifestyle    Physical activity     Days per week: Not on file     Minutes per session: Not on file    Stress: Not on file   Relationships    Social connections     Talks on phone: Not on file     Gets together: Not on file     Attends [employer]ism service: Not on file     Active member of club or organization: Not on file     Attends meetings of clubs or organizations: Not on file     Relationship status: Not on file    Intimate partner violence     Fear of current or ex partner: Not on file     Emotionally abused: Not on file     Physically abused: Not on file     Forced sexual activity: Not on file   Other Topics Concern    Not on file   Social History Narrative    Not on file        Current Outpatient Medications   Medication Sig Dispense Refill    promethazine (PHENERGAN) 12.5 MG tablet Take 1 tablet by mouth every 8 hours as needed for Nausea (moderate to severe headache) 14 tablet 2    aspirin-acetaminophen-caffeine (EXCEDRIN MIGRAINE) 250-250-65 MG per tablet Take 2 tablets by mouth as needed for Headaches (severe headaches) Take 2 tablets once every 24 hours (maximum: 2 tablets/day). Note: Limit use to no more than 2 days per week 28 tablet 2    Magnesium Oxide 400 MG CAPS Take 400 mg by mouth daily 30 capsule 3    diphenhydrAMINE (BENADRYL ALLERGY) 25 MG capsule Take 1 capsule by mouth 2 times daily as needed (Severe headache with nausea and vomiting.) 28 capsule 2    naproxen sodium (ANAPROX) 550 MG tablet Take 1 tablet by mouth 2 times daily as needed for Pain (Headache) 14 tablet 3    fluticasone (FLONASE) 50 MCG/ACT nasal spray       celecoxib (CELEBREX) 200 MG capsule TAKE 1 CAPSULE BY MOUTH TWICE DAILY 180 capsule 1    Multiple Vitamins-Minerals (MULTI-B-PLUS PO) Take by mouth      cabergoline (DOSTINEX) 0.5 MG tablet       Levocetirizine Dihydrochloride (XYZAL ALLERGY 24HR PO) Take by mouth      Fluticasone Furoate (FLONASE SENSIMIST NA) by Nasal route       No current facility-administered medications for this visit. ALLERGIES  No Known Allergies     REVIEW OF SYSTEMS:     Review of Systems   Constitutional: Positive for fatigue.  Negative for hallucinations or delusions     Cranial nerves   II - visual fields intact to confrontation                                                III, IV, VI - extra-ocular muscles full: no pupillary defect; no MEEK, no nystagmus, no ptosis   V - normal facial sensation                                                               VII - normal facial symmetry                                                             VIII - intact hearing                                                                             IX, X - symmetrical palate                                                                  XI - symmetrical shoulder shrug                                                       XII - midline tongue without atrophy or fasciculation     Motor function  Normal muscle bulk and tone  Muscle strength: normal power 5/5     Sensory function Intact to touch in bilateral upper and lower extremities. Cerebellar No involuntary movements or tremors     Reflex function Intact 2+ DTR and symmetric. Negative Babinski     Gait                  Normal station and gait           PRIOR TESTS AND IMAGING: Following images and Labs were reviewed by the examiner         MRI Brain with and without contrast: 6/13/2019          Vit D :  TSH :       ASSESSMENT / PLAN:     Roula Velasquez is a 41 y.o. right handed  female with PMH significant for pituitary macroadenoma, PTSD, depression, ADHD, anxiety was seen in the clinic for headaches    · Worsening headaches   · Chronic migraine headaches without auras  · Medication overuse headache  · H/o pituitary microadenoma  · PTSD  · Depression and depression  · ADHD        Plan  - STOP these treatments:   Daily Tylenol or Motrin or Excedrin Migraine. I informed the pt that the headaches may worsen over the next couple of weeks after stopping the overused medication. The washout period for overused medications could even be as long as 2-3 months. - For Headache Prevention:   We care.      Electronically signed by Dayne Gagnon MD on 8/11/2020 at 5:18 PM

## 2020-09-10 ENCOUNTER — HOSPITAL ENCOUNTER (OUTPATIENT)
Dept: MRI IMAGING | Age: 40
Discharge: HOME | End: 2020-09-12
Payer: COMMERCIAL

## 2020-09-10 PROCEDURE — 70553 MRI BRAIN STEM W/O & W/DYE: CPT

## 2020-09-10 PROCEDURE — 6360000004 HC RX CONTRAST MEDICATION: Performed by: STUDENT IN AN ORGANIZED HEALTH CARE EDUCATION/TRAINING PROGRAM

## 2020-09-10 PROCEDURE — A9579 GAD-BASE MR CONTRAST NOS,1ML: HCPCS | Performed by: STUDENT IN AN ORGANIZED HEALTH CARE EDUCATION/TRAINING PROGRAM

## 2020-09-10 RX ADMIN — GADOTERIDOL 14 ML: 279.3 INJECTION, SOLUTION INTRAVENOUS at 18:00

## 2020-09-22 ENCOUNTER — OFFICE VISIT (OUTPATIENT)
Dept: NEUROLOGY | Age: 40
End: 2020-09-22
Payer: COMMERCIAL

## 2020-09-22 VITALS
TEMPERATURE: 97.2 F | RESPIRATION RATE: 16 BRPM | BODY MASS INDEX: 26.52 KG/M2 | DIASTOLIC BLOOD PRESSURE: 87 MMHG | HEART RATE: 79 BPM | WEIGHT: 165 LBS | HEIGHT: 66 IN | SYSTOLIC BLOOD PRESSURE: 123 MMHG

## 2020-09-22 PROCEDURE — 64405 NJX AA&/STRD GR OCPL NRV: CPT | Performed by: STUDENT IN AN ORGANIZED HEALTH CARE EDUCATION/TRAINING PROGRAM

## 2020-09-22 PROCEDURE — G8419 CALC BMI OUT NRM PARAM NOF/U: HCPCS | Performed by: STUDENT IN AN ORGANIZED HEALTH CARE EDUCATION/TRAINING PROGRAM

## 2020-09-22 PROCEDURE — 1036F TOBACCO NON-USER: CPT | Performed by: STUDENT IN AN ORGANIZED HEALTH CARE EDUCATION/TRAINING PROGRAM

## 2020-09-22 PROCEDURE — G8427 DOCREV CUR MEDS BY ELIG CLIN: HCPCS | Performed by: STUDENT IN AN ORGANIZED HEALTH CARE EDUCATION/TRAINING PROGRAM

## 2020-09-22 PROCEDURE — 99214 OFFICE O/P EST MOD 30 MIN: CPT | Performed by: STUDENT IN AN ORGANIZED HEALTH CARE EDUCATION/TRAINING PROGRAM

## 2020-09-22 PROCEDURE — 96372 THER/PROPH/DIAG INJ SC/IM: CPT | Performed by: STUDENT IN AN ORGANIZED HEALTH CARE EDUCATION/TRAINING PROGRAM

## 2020-09-22 RX ORDER — METHYLPREDNISOLONE ACETATE 40 MG/ML
120 INJECTION, SUSPENSION INTRA-ARTICULAR; INTRALESIONAL; INTRAMUSCULAR; SOFT TISSUE ONCE
Status: COMPLETED | OUTPATIENT
Start: 2020-09-22 | End: 2020-09-22

## 2020-09-22 RX ORDER — LIDOCAINE HYDROCHLORIDE 10 MG/ML
3 INJECTION, SOLUTION EPIDURAL; INFILTRATION; INTRACAUDAL; PERINEURAL ONCE
Status: COMPLETED | OUTPATIENT
Start: 2020-09-22 | End: 2020-09-22

## 2020-09-22 RX ADMIN — LIDOCAINE HYDROCHLORIDE 3 ML: 10 INJECTION, SOLUTION EPIDURAL; INFILTRATION; INTRACAUDAL; PERINEURAL at 11:46

## 2020-09-22 RX ADMIN — METHYLPREDNISOLONE ACETATE 120 MG: 40 INJECTION, SUSPENSION INTRA-ARTICULAR; INTRALESIONAL; INTRAMUSCULAR; SOFT TISSUE at 11:47

## 2020-09-22 ASSESSMENT — ENCOUNTER SYMPTOMS
COUGH: 0
ABDOMINAL PAIN: 0
SORE THROAT: 0
CONSTIPATION: 0
DIARRHEA: 0
SINUS PAIN: 0
EYE PAIN: 0
EYE DISCHARGE: 0
PHOTOPHOBIA: 1
NAUSEA: 0
SHORTNESS OF BREATH: 0
VOMITING: 0
EYE REDNESS: 0

## 2020-09-22 NOTE — PROGRESS NOTES
[address] [place] [address],  [address], [facility], [facility], [address] [place] [address]  P: [phone]  F: [phone]    NEUROLOGY CLINIC NOTE     PATIENT NAME: Atul Yeboah  PATIENT MRN: [id number]  PRIMARY CARE PHYSICIAN: Ramy Higgins    Interval history 9/22/2020  Patient was last seen in the clinic in August 2020, since last visit endorses significant improvement in her headache with magnesium oxide, but for last 1 week started having headaches again especially associated with the weather changes. Endorses headache 8/10 intensity, located at the top of the head and back of the head associated with photophobia, phonophobia. Denies any side effects from magnesium oxide. Taking naproxen sodium or Excedrin as needed for severe headaches, denies any side effects from the medications. Endorses improvement in the headache with both medications. Has taken Benadryl and Phenergan once, endorses improvement in her symptoms with that. MRI brain with and without contrast was done on 9/10/2020, showed pituitary stalk deviated to the right side. No focal pituitary mass noted. Otherwise unremarkable exam.  Patient has stopped taking any other over-the-counter pain medication. Denies any other neurologic concerns at present. Notes from 8/11/2020  HPI:      Atul Yeboah is a 41 y.o. right handed  female with PMH significant for pituitary macroadenoma, PTSD, depression, ADHD, anxiety was seen in the clinic for headaches    History obtained from Patient     Headaches  Headaches started in her early 25s, gradually getting worse for last 7 to 8 months. She endorses headache mainly located at the top of the head, starts as a dull pain, throbbing/pulsating in nature, occurring almost daily, severe headaches occur once or twice in a week, 4-8/10 intensity, usually gradual in onset, takes around 2 to 3 hours to build up and last for few hours to whole day.   Denies any warning signs prior to the onset of the headache. Endorses nausea, photophobia, sometimes phonophobia, changes in the speech, dizziness, fatigue and watering from the eyes. Denies any phonophobia or visual changes or focal tingling numbness or muscle weakness or mood changes or personality changes or lightheadedness or redness in the eyes or watering from the nose or facial sweating.     Headaches are typically triggered by stress, skipping meals, hunger , fasting, bright lights and sunlight     Headaches are relieved by OTC NSAIDs and lying in a darkened room    Headache free days per month: 2-3  Cutaneous allodynia: yes - always  History of:  Family history of headaches or migraines: no  Renal stones: no   Motion sickness:  no  Head/Neck Trauma: no  Stressors: no  Sleep: 6  Hours,            Difficulty in initiating or maintaining sleep: yes -both,              Snoring: no  Psych/Mood: Appropriate  Caffeine: Oz coffee per day    Headache Medications  Current abortive meds: Tylenol, Motrin, Excedrin Migraine, Benadryl  Current prophylactic meds: None  Previous abortive medications tried: OTC pain medications  Previous prophylactic medications tried: None                                                                         PATIENT HISTORY:     Past Medical History:   Diagnosis Date    ADHD (attention deficit hyperactivity disorder)     Allergic rhinitis     Anxiety     Depression     High risk HPV infection 4/29/14    LGSIL Pap smear of vagina 4/29/14    Pituitary disorder (HCC)     pituitary tumor    PTSD (post-traumatic stress disorder)     PTSD (post-traumatic stress disorder)     Vaginal high risk human papillomavirus (HPV) DNA test positive     Cuff; s/p vag hyst        Past Surgical History:   Procedure Laterality Date    COLPOSCOPY  6/26/14    COLPOSCOPY  2/12/2015    due to lgsil on pap    HYSTERECTOMY, VAGINAL      OTHER SURGICAL HISTORY      Septaplasty    THROAT SURGERY  1980    cyst from throat Social History     Socioeconomic History    Marital status:      Spouse name: Not on file    Number of children: Not on file    Years of education: Not on file    Highest education level: Not on file   Occupational History    Occupation: [employer] Financial resource strain: Not on file    Food insecurity     Worry: Not on file     Inability: Not on file   [employer] needs     Medical: Not on file     Non-medical: Not on file   Tobacco Use    Smoking status: Never Smoker    Smokeless tobacco: Never Used   Substance and Sexual Activity    Alcohol use:  Yes     Alcohol/week: 0.0 standard drinks     Frequency: Monthly or less     Comment: SOCIAL    Drug use: No    Sexual activity: Yes     Partners: Male     Birth control/protection: Surgical     Comment: VASECTOMY AND HYSTERECTOMY   Lifestyle    Physical activity     Days per week: Not on file     Minutes per session: Not on file    Stress: Not on file   Relationships    Social connections     Talks on phone: Not on file     Gets together: Not on file     Attends Holiness service: Not on file     Active member of club or organization: Not on file     Attends meetings of clubs or organizations: Not on file     Relationship status: Not on file    Intimate partner violence     Fear of current or ex partner: Not on file     Emotionally abused: Not on file     Physically abused: Not on file     Forced sexual activity: Not on file   Other Topics Concern    Not on file   Social History Narrative    Not on file        Current Outpatient Medications   Medication Sig Dispense Refill    promethazine (PHENERGAN) 12.5 MG tablet Take 1 tablet by mouth every 8 hours as needed for Nausea (moderate to severe headache) 14 tablet 2    aspirin-acetaminophen-caffeine (EXCEDRIN MIGRAINE) 250-250-65 MG per tablet Take 2 tablets by mouth as needed for Headaches (severe headaches) Take 2 tablets once every 24 hours (maximum: 2 tablets/day). Note: Limit use to no more than 2 days per week 28 tablet 2    diphenhydrAMINE (BENADRYL ALLERGY) 25 MG capsule Take 1 capsule by mouth 2 times daily as needed (Severe headache with nausea and vomiting.) 28 capsule 2    naproxen sodium (ANAPROX) 550 MG tablet Take 1 tablet by mouth 2 times daily as needed for Pain (Headache) 14 tablet 3    fluticasone (FLONASE) 50 MCG/ACT nasal spray       celecoxib (CELEBREX) 200 MG capsule TAKE 1 CAPSULE BY MOUTH TWICE DAILY 180 capsule 1    Levocetirizine Dihydrochloride (XYZAL ALLERGY 24HR PO) Take by mouth      Fluticasone Furoate (FLONASE SENSIMIST NA) by Nasal route      Multiple Vitamins-Minerals (MULTI-B-PLUS PO) Take by mouth      cabergoline (DOSTINEX) 0.5 MG tablet        Current Facility-Administered Medications   Medication Dose Route Frequency Provider Last Rate Last Dose    lidocaine PF 1 % injection 3 mL  3 mL Other Once Rohan Conner MD        methylPREDNISolone acetate (DEPO-MEDROL) injection 120 mg  120 mg Intramuscular Once Rohan Conner MD            ALLERGIES  No Known Allergies     REVIEW OF SYSTEMS:     Review of Systems   Constitutional: Positive for fatigue. Negative for chills, diaphoresis, fever and unexpected weight change. HENT: Negative for congestion, ear discharge, ear pain, hearing loss, sinus pain and sore throat. Eyes: Positive for photophobia. Negative for pain, discharge, redness and visual disturbance. Respiratory: Negative for cough and shortness of breath. Cardiovascular: Negative for chest pain, palpitations and leg swelling. Gastrointestinal: Negative for abdominal pain, constipation, diarrhea, nausea and vomiting. Endocrine: Negative for polydipsia and polyuria. Genitourinary: Negative for difficulty urinating and hematuria. Musculoskeletal: Negative for neck pain. Skin: Negative for pallor and rash. Neurological: Positive for light-headedness and headaches.  Negative for dizziness, tremors, tone  Muscle strength: normal power 5/5     Sensory function Intact to touch in bilateral upper and lower extremities. Cerebellar No involuntary movements or tremors     Reflex function Intact 2+ DTR and symmetric. Negative Babinski     Gait                  Normal station and gait           PRIOR TESTS AND IMAGING: Following images and Labs were reviewed by the examiner         MRI Brain with and without contrast: 6/13/2019          MRI brain with and without contrast 9/10/2020  Impression    The pituitary stalk is deviated to the right.  No focal pituitary mass is    detected.  The exam is otherwise unremarkable. Vit D : 38.7 on 8/11/2020      ASSESSMENT / PLAN:     Huy Alba is a 41 y.o. right handed  female was seen in the clinic for headaches    · Worsening headaches   · Chronic migraine headaches without auras  · Medication overuse headache  · H/o pituitary microadenoma  · PTSD  · Depression and depression  · ADHD        Plan  - STOP these treatments:   Daily Tylenol or Motrin or Excedrin Migraine. I informed the pt that the headaches may worsen over the next couple of weeks after stopping the overused medication. The washout period for overused medications could even be as long as 2-3 months. - For Headache Prevention:  Continue magnesium oxide 400 mg daily for headache prophylaxis     Remember that preventative medications usually take at least 4-6 weeks to be effective in reducing the frequency of your headaches. - For mild to moderate headache   Naproxen sodium 550 mg twice a day as needed ( take with Pepcid to avoid worsening of GERD)   Vistaril 25mg twice a day as needed    - For severe headache            Excedrin Migraine Oral:   (Acetaminophen 250 mg/aspirin 250 mg/caffeine 65 mg)  2 tablets once every 24 hours (maximum: 2 tablets/day).    Note: Limit use to no more than 2 days per week     - For rescue   Benadryl 25 mg twice daily as needed with Phenergan 12.5 mg twice daily PRN for severe nausea vomiting associated with severe headaches.    -We will give bilateral occipital nerve block today. - Tests:    MRI brain with and without contrast to follow-up on pituitary macroadenoma -discussed MRI brain findings with the patient about no pituitary mass seen, but pituitary stalk deviated to the right. Instructed patient to follow-up with her endocrinologist regarding further management of the pituitary microadenoma   LABS:  Vitamin D level-38.7 on 8/11/2020      - Follow-up with her endocrinologist regarding the pituitary mass. - Maintain Headache diary to see if there is a pattern to your headaches. - Discussed headache triggers with patients and instructed to avoid them. - Follow up in the clinic in 3 months  - Instructed patient to call the clinic if symptoms worsen or develop any new symptoms. I have spent 25 minutes face to face with the patient more than 50% of this time was spent counseling regarding headache differentials, medications used for headache management, their side effects and coordinating care.       Electronically signed by Vimal Gould MD on 9/22/2020 at 11:46 AM

## 2020-09-22 NOTE — PROGRESS NOTES
OCCIPITAL NERVE BLOCK PROCEDURE NOTE:            PATIENT: Sylwia Bowens    ALLERGIES: No Known Allergies    DESCRIPTION OF PROCEDURE: The risks and benefits of the procedure were discussed with the patient. All of the patient's questions were answered. An appropriate timeout was performed prior to the procedure. The patient was placed in the seated position. The right and left occipital grove was identified. The area was cleansed with an alcohol swab. 1.5mL of (Dose) Depo medrol and 1.5mL of (dose) 1% Lidocaine were injected at each site. The patient tolerated the procedure well. COMPLICATIONS: None.      Electronically signed by Rohan Conner MD on 9/22/2020 at 11:46 AM

## 2020-09-23 ENCOUNTER — HOSPITAL ENCOUNTER (OUTPATIENT)
Age: 40
Discharge: HOME | End: 2020-09-23
Payer: COMMERCIAL

## 2020-09-23 LAB
ABSOLUTE EOS #: 0.09 K/UL (ref 0–0.44)
ABSOLUTE IMMATURE GRANULOCYTE: <0.03 K/UL (ref 0–0.3)
ABSOLUTE LYMPH #: 1.55 K/UL (ref 1.1–3.7)
ABSOLUTE MONO #: 0.72 K/UL (ref 0.1–1.2)
BASOPHILS # BLD: 0 % (ref 0–2)
BASOPHILS ABSOLUTE: <0.03 K/UL (ref 0–0.2)
DIFFERENTIAL TYPE: ABNORMAL
EOSINOPHILS RELATIVE PERCENT: 1 % (ref 1–4)
FERRITIN: 76 UG/L (ref 13–150)
HCT VFR BLD CALC: 41 % (ref 36.3–47.1)
HEMOGLOBIN: 13 G/DL (ref 11.9–15.1)
IGA: 255 MG/DL (ref 70–400)
IMMATURE GRANULOCYTES: 0 %
IRON SATURATION: 22 % (ref 20–55)
IRON: 59 UG/DL (ref 37–145)
LYMPHOCYTES # BLD: 19 % (ref 24–43)
MCH RBC QN AUTO: 30.7 PG (ref 25.2–33.5)
MCHC RBC AUTO-ENTMCNC: 31.7 G/DL (ref 28.4–34.8)
MCV RBC AUTO: 96.7 FL (ref 82.6–102.9)
MONOCYTES # BLD: 9 % (ref 3–12)
NRBC AUTOMATED: 0 PER 100 WBC
PDW BLD-RTO: 12.7 % (ref 11.8–14.4)
PLATELET # BLD: 369 K/UL (ref 138–453)
PLATELET ESTIMATE: ABNORMAL
PMV BLD AUTO: 10.5 FL (ref 8.1–13.5)
RBC # BLD: 4.24 M/UL (ref 3.95–5.11)
RBC # BLD: ABNORMAL 10*6/UL
SEG NEUTROPHILS: 71 % (ref 36–65)
SEGMENTED NEUTROPHILS ABSOLUTE COUNT: 5.89 K/UL (ref 1.5–8.1)
TOTAL IRON BINDING CAPACITY: 268 UG/DL (ref 250–450)
UNSATURATED IRON BINDING CAPACITY: 209 UG/DL (ref 112–347)
WBC # BLD: 8.3 K/UL (ref 3.5–11.3)
WBC # BLD: ABNORMAL 10*3/UL

## 2020-09-23 PROCEDURE — 83550 IRON BINDING TEST: CPT

## 2020-09-23 PROCEDURE — 82728 ASSAY OF FERRITIN: CPT

## 2020-09-23 PROCEDURE — 85025 COMPLETE CBC W/AUTO DIFF WBC: CPT

## 2020-09-23 PROCEDURE — 82784 ASSAY IGA/IGD/IGG/IGM EACH: CPT

## 2020-09-23 PROCEDURE — 36415 COLL VENOUS BLD VENIPUNCTURE: CPT

## 2020-09-23 PROCEDURE — 83516 IMMUNOASSAY NONANTIBODY: CPT

## 2020-09-23 PROCEDURE — 83540 ASSAY OF IRON: CPT

## 2020-09-24 LAB — TISSUE TRANSGLUTAMINASE IGA: 1 U/ML

## 2020-10-09 ENCOUNTER — OFFICE VISIT (OUTPATIENT)
Dept: ORTHOPEDIC SURGERY | Age: 40
End: 2020-10-09
Payer: COMMERCIAL

## 2020-10-09 PROCEDURE — G8484 FLU IMMUNIZE NO ADMIN: HCPCS | Performed by: ORTHOPAEDIC SURGERY

## 2020-10-09 PROCEDURE — 1036F TOBACCO NON-USER: CPT | Performed by: ORTHOPAEDIC SURGERY

## 2020-10-09 PROCEDURE — G8428 CUR MEDS NOT DOCUMENT: HCPCS | Performed by: ORTHOPAEDIC SURGERY

## 2020-10-09 PROCEDURE — 99203 OFFICE O/P NEW LOW 30 MIN: CPT | Performed by: ORTHOPAEDIC SURGERY

## 2020-10-09 PROCEDURE — G8419 CALC BMI OUT NRM PARAM NOF/U: HCPCS | Performed by: ORTHOPAEDIC SURGERY

## 2020-10-09 RX ORDER — METHYLPREDNISOLONE 4 MG/1
TABLET ORAL
Qty: 1 KIT | Refills: 0 | Status: SHIPPED | OUTPATIENT
Start: 2020-10-09 | End: 2020-10-15

## 2020-12-03 ENCOUNTER — TELEPHONE (OUTPATIENT)
Dept: PRIMARY CARE CLINIC | Age: 40
End: 2020-12-03

## 2020-12-03 NOTE — TELEPHONE ENCOUNTER
Patient at window, had to reschedule today's appt due to Tano Hunter being ill. She would like something called in for her anxiety. Has appt 12/8/20 but feels like she needs something now. Winter Bethea.

## 2020-12-04 ENCOUNTER — HOSPITAL ENCOUNTER (OUTPATIENT)
Dept: WOMENS IMAGING | Age: 40
Discharge: HOME | End: 2020-12-06
Payer: COMMERCIAL

## 2020-12-04 PROCEDURE — G0279 TOMOSYNTHESIS, MAMMO: HCPCS

## 2020-12-04 PROCEDURE — 76642 ULTRASOUND BREAST LIMITED: CPT

## 2020-12-07 ENCOUNTER — PATIENT MESSAGE (OUTPATIENT)
Dept: OBGYN CLINIC | Age: 40
End: 2020-12-07

## 2020-12-07 ENCOUNTER — TELEPHONE (OUTPATIENT)
Dept: OBGYN CLINIC | Age: 40
End: 2020-12-07

## 2020-12-07 NOTE — TELEPHONE ENCOUNTER
----- Message from PADMINI Gomez CNP sent at 12/4/2020 11:26 AM EST -----  Her previous cysts have resolved, but she now has new complicated cyst in right breast that radiology is recommending be followed short term with ultrasound of right breast in 6 months and she will be due for bilateral mammogram at that point please place order for right breast US and bilateral diagnostic mammogram in 6 months and notify pt.

## 2020-12-07 NOTE — TELEPHONE ENCOUNTER
From: Suman Macias  To: PADMINI Marshall CNP  Sent: 12/7/2020 12:56 PM EST  Subject: Test Results Question    Thank you and yes that would be terrific. Thank you for the quick response. Have a great day! Madhuri Mercado      ----- Message -----   From:Gretchen Sherryle Krill, APRN - CNP   Sent:12/7/2020 11:40 AM EST   To:Basilia Ascencio   Subject:RE: Test Results Question    Madhuri Rogelio    I completely understand being nervous! ! I did see that some of the other cysts may have resolved but yes there were new areas found. I would definitely be willing to refer you to a breast surgeon Dr. Abigail Patten and she could discuss with you about doing a biopsy on these areas. Are you okay with that? Daysi Cuevas CNP       ----- Message -----   From:Basilia Ascencio   Sent:12/7/2020 11:24 AM EST   To:PADMINI Suggs CNP   Subject:Test Results Question    Hello,    I had my 6mts follow-up Mammogram and ultrasound on Friday and not only where the 2 lumps in the upper quadrant still there but there is now a 3rd lump in the lower quadrant. The radiologist advised the ultrasound tech that they were nothing concerning to her but I am quite concerned. I have some anxiety issues and the last 6mts were very difficult just waiting to see if the 2 were still going to be there or if they had grown, etc. I honestly do not like the idea of just waiting to see for another 6 months. I was wondering if it would be possible to at least get a biopsy just to be on the safe side? Breast cancer statistics aren't great and I would just feel much better knowing sooner rather than later. I look forward to hearing your thoughts and advise.     Thank you,  Suman Macias

## 2020-12-07 NOTE — TELEPHONE ENCOUNTER
From: Bernabe Zapien  To: Roel Pan APRN - CNP  Sent: 12/7/2020 11:24 AM EST  Subject: Test Results Question    Hello,    I had my 6mts follow-up Mammogram and ultrasound on Friday and not only where the 2 lumps in the upper quadrant still there but there is now a 3rd lump in the lower quadrant. The radiologist advised the ultrasound tech that they were nothing concerning to her but I am quite concerned. I have some anxiety issues and the last 6mts were very difficult just waiting to see if the 2 were still going to be there or if they had grown, etc. I honestly do not like the idea of just waiting to see for another 6 months. I was wondering if it would be possible to at least get a biopsy just to be on the safe side? Breast cancer statistics aren't great and I would just feel much better knowing sooner rather than later. I look forward to hearing your thoughts and advise.     Thank you,  Bernabe Zapien

## 2020-12-08 ENCOUNTER — OFFICE VISIT (OUTPATIENT)
Dept: PRIMARY CARE CLINIC | Age: 40
End: 2020-12-08
Payer: COMMERCIAL

## 2020-12-08 VITALS
HEART RATE: 98 BPM | OXYGEN SATURATION: 98 % | SYSTOLIC BLOOD PRESSURE: 118 MMHG | WEIGHT: 157.8 LBS | DIASTOLIC BLOOD PRESSURE: 80 MMHG | BODY MASS INDEX: 25.47 KG/M2

## 2020-12-08 PROBLEM — D35.2 PITUITARY MICROADENOMA (HCC): Status: ACTIVE | Noted: 2020-12-08

## 2020-12-08 PROBLEM — Z77.22 PASSIVE SMOKER: Status: RESOLVED | Noted: 2018-03-06 | Resolved: 2020-12-08

## 2020-12-08 PROBLEM — D35.2 PROLACTINOMA (HCC): Status: ACTIVE | Noted: 2020-12-08

## 2020-12-08 PROBLEM — J32.4 CHRONIC PANSINUSITIS: Status: ACTIVE | Noted: 2020-12-08

## 2020-12-08 PROBLEM — I10 ESSENTIAL HYPERTENSION: Status: RESOLVED | Noted: 2018-09-24 | Resolved: 2020-12-08

## 2020-12-08 PROBLEM — K59.00 CONSTIPATION: Status: ACTIVE | Noted: 2020-12-08

## 2020-12-08 PROBLEM — M25.50 PAIN IN JOINT: Status: ACTIVE | Noted: 2020-12-08

## 2020-12-08 PROBLEM — F51.02 ADJUSTMENT INSOMNIA: Status: ACTIVE | Noted: 2020-12-08

## 2020-12-08 PROCEDURE — G8419 CALC BMI OUT NRM PARAM NOF/U: HCPCS | Performed by: PHYSICIAN ASSISTANT

## 2020-12-08 PROCEDURE — 90686 IIV4 VACC NO PRSV 0.5 ML IM: CPT | Performed by: PHYSICIAN ASSISTANT

## 2020-12-08 PROCEDURE — 1036F TOBACCO NON-USER: CPT | Performed by: PHYSICIAN ASSISTANT

## 2020-12-08 PROCEDURE — G8427 DOCREV CUR MEDS BY ELIG CLIN: HCPCS | Performed by: PHYSICIAN ASSISTANT

## 2020-12-08 PROCEDURE — 90471 IMMUNIZATION ADMIN: CPT | Performed by: PHYSICIAN ASSISTANT

## 2020-12-08 PROCEDURE — G8482 FLU IMMUNIZE ORDER/ADMIN: HCPCS | Performed by: PHYSICIAN ASSISTANT

## 2020-12-08 PROCEDURE — 99214 OFFICE O/P EST MOD 30 MIN: CPT | Performed by: PHYSICIAN ASSISTANT

## 2020-12-08 RX ORDER — ESZOPICLONE 2 MG/1
2 TABLET, FILM COATED ORAL NIGHTLY
Qty: 15 TABLET | Refills: 0 | Status: SHIPPED | OUTPATIENT
Start: 2020-12-08 | End: 2020-12-23

## 2020-12-08 RX ORDER — ESCITALOPRAM OXALATE 10 MG/1
10 TABLET ORAL DAILY
Qty: 30 TABLET | Refills: 1 | Status: SHIPPED | OUTPATIENT
Start: 2020-12-08 | End: 2021-02-15 | Stop reason: SDUPTHER

## 2020-12-08 ASSESSMENT — ENCOUNTER SYMPTOMS
ROS SKIN COMMENTS: HAIR LOSS
SHORTNESS OF BREATH: 0
VOMITING: 0
BACK PAIN: 0
DIARRHEA: 1
NAUSEA: 0

## 2020-12-22 RX ORDER — MAGNESIUM OXIDE 400 MG/1
TABLET ORAL
Qty: 30 TABLET | Refills: 0 | Status: SHIPPED | OUTPATIENT
Start: 2020-12-22 | End: 2021-10-19 | Stop reason: ALTCHOICE

## 2020-12-22 NOTE — TELEPHONE ENCOUNTER
E-scribe requesting refill for Mag Ox. Please review and e-scribe if applicable.      Last Visit Date: 9.22.20    Next Visit Date:  Future Appointments   Date Time Provider Raffi Francis   1/19/2021  7:45 [facility]   1/21/2021 10:10 AM Mary Carmen Leo MD [facility]

## 2021-02-02 NOTE — TELEPHONE ENCOUNTER
Pharmacy requesting refill of mag ox.     Date of last fill: 1.30.2021    Date of next follow up appointment: na    Pt notified response time for refills is 24-48 hours

## 2021-02-15 RX ORDER — ESCITALOPRAM OXALATE 10 MG/1
10 TABLET ORAL DAILY
Qty: 30 TABLET | Refills: 3 | Status: SHIPPED | OUTPATIENT
Start: 2021-02-15 | End: 2021-02-26 | Stop reason: SDUPTHER

## 2021-02-26 RX ORDER — ESCITALOPRAM OXALATE 10 MG/1
10 TABLET ORAL DAILY
Qty: 30 TABLET | Refills: 3 | Status: SHIPPED | OUTPATIENT
Start: 2021-02-26 | End: 2021-03-16 | Stop reason: DRUGHIGH

## 2021-02-26 NOTE — TELEPHONE ENCOUNTER
Pt calling to check on the status of this request. Pt notified that this was sent to [facility] and she states that this was supposed to go to [facility].   WM rx cancelled through fax/scanned   Rx sent to [id number]

## 2021-03-16 ENCOUNTER — OFFICE VISIT (OUTPATIENT)
Dept: PRIMARY CARE CLINIC | Age: 41
End: 2021-03-16
Payer: COMMERCIAL

## 2021-03-16 VITALS
OXYGEN SATURATION: 96 % | TEMPERATURE: 96.4 F | HEIGHT: 66 IN | SYSTOLIC BLOOD PRESSURE: 118 MMHG | DIASTOLIC BLOOD PRESSURE: 68 MMHG | HEART RATE: 79 BPM | WEIGHT: 155.4 LBS | BODY MASS INDEX: 24.98 KG/M2

## 2021-03-16 DIAGNOSIS — J30.9 ALLERGIC RHINITIS, UNSPECIFIED SEASONALITY, UNSPECIFIED TRIGGER: Primary | ICD-10-CM

## 2021-03-16 DIAGNOSIS — M54.2 NECK PAIN: ICD-10-CM

## 2021-03-16 DIAGNOSIS — F41.1 GAD (GENERALIZED ANXIETY DISORDER): ICD-10-CM

## 2021-03-16 DIAGNOSIS — J06.9 VIRAL UPPER RESPIRATORY TRACT INFECTION: ICD-10-CM

## 2021-03-16 DIAGNOSIS — G43.009 MIGRAINE WITHOUT AURA AND WITHOUT STATUS MIGRAINOSUS, NOT INTRACTABLE: ICD-10-CM

## 2021-03-16 DIAGNOSIS — L70.9 ACNE, MILD: ICD-10-CM

## 2021-03-16 PROCEDURE — G8482 FLU IMMUNIZE ORDER/ADMIN: HCPCS | Performed by: PHYSICIAN ASSISTANT

## 2021-03-16 PROCEDURE — G8427 DOCREV CUR MEDS BY ELIG CLIN: HCPCS | Performed by: PHYSICIAN ASSISTANT

## 2021-03-16 PROCEDURE — 99214 OFFICE O/P EST MOD 30 MIN: CPT | Performed by: PHYSICIAN ASSISTANT

## 2021-03-16 PROCEDURE — 1036F TOBACCO NON-USER: CPT | Performed by: PHYSICIAN ASSISTANT

## 2021-03-16 PROCEDURE — G8419 CALC BMI OUT NRM PARAM NOF/U: HCPCS | Performed by: PHYSICIAN ASSISTANT

## 2021-03-16 RX ORDER — UBROGEPANT 100 MG/1
100 TABLET ORAL DAILY
Qty: 3 TABLET | Refills: 0 | COMMUNITY
Start: 2021-03-16 | End: 2021-05-21 | Stop reason: SDUPTHER

## 2021-03-16 RX ORDER — CELECOXIB 200 MG/1
CAPSULE ORAL
Qty: 180 CAPSULE | Refills: 1 | Status: SHIPPED | OUTPATIENT
Start: 2021-03-16 | End: 2021-10-19 | Stop reason: ALTCHOICE

## 2021-03-16 RX ORDER — ESCITALOPRAM OXALATE 20 MG/1
20 TABLET ORAL DAILY
Qty: 30 TABLET | Refills: 5 | Status: SHIPPED | OUTPATIENT
Start: 2021-03-16 | End: 2021-05-21 | Stop reason: SDUPTHER

## 2021-03-16 RX ORDER — ADAPALENE 0.1 G/100G
CREAM TOPICAL
Qty: 45 G | Refills: 0 | Status: ON HOLD | OUTPATIENT
Start: 2021-03-16 | End: 2021-11-24

## 2021-03-16 ASSESSMENT — ENCOUNTER SYMPTOMS
RHINORRHEA: 1
BACK PAIN: 1
COUGH: 0
EYE DISCHARGE: 1
SINUS PAIN: 1
SHORTNESS OF BREATH: 0
SORE THROAT: 0

## 2021-03-16 NOTE — PROGRESS NOTES
[facility] PRIMARY CARE  [address] [place] [id number]  Dept: [phone]    Javier Lopez is a 41 y.o. female who presents today for her medical conditions/complaintsas noted below. Chief Complaint   Patient presents with    Anxiety     patient said the Lexapro helps some but not enough. Patient has FMLA form  that need updated     Otalgia     pt has pain in both ears. HPI:     HPI  Anxiety: was depressed 1-2 days this month and had 2 panic attacks in last month. lexapro helps about 50%    FMLA is for anxiety attacks and migraines. Migraines are 2-3 times a month. Does use Mag and Flonase too. Relpax can help but mostly uses Exedrine. Ear pain:  4-5 days now  Congestion: taking OTC decongestant. HA for last 10 days --not a migraine.        LDL Cholesterol (mg/dL)   Date Value   07/09/2019 78     LDL Calculated (mg/dL)   Date Value   03/10/2018 98   03/21/2016 85       (goal LDL is <100)   AST (U/L)   Date Value   07/09/2019 12     ALT (U/L)   Date Value   07/09/2019 9     BUN (mg/dL)   Date Value   07/09/2019 13     BP Readings from Last 3 Encounters:   03/16/21 118/68   12/08/20 118/80   09/22/20 123/87          (goal 120/80)    Past Medical History:   Diagnosis Date    ADHD (attention deficit hyperactivity disorder)     Allergic rhinitis     Anxiety     Depression     High risk HPV infection 4/29/14    LGSIL Pap smear of vagina 4/29/14    Pituitary disorder (HCC)     pituitary tumor    PTSD (post-traumatic stress disorder)     Vaginal high risk human papillomavirus (HPV) DNA test positive     Cuff; s/p vag hyst      Past Surgical History:   Procedure Laterality Date    COLPOSCOPY  6/26/14    COLPOSCOPY  2/12/2015    due to lgsil on pap    HYSTERECTOMY, VAGINAL      OTHER SURGICAL HISTORY      Septaplasty    THROAT SURGERY  1980    cyst from throat       Family History   Problem Relation Age of Onset    Other Father vaccine (3 - Td) 08/22/2025    Flu vaccine  Completed    HIV screen  Completed    Hepatitis A vaccine  Aged Out    Hepatitis B vaccine  Aged Out    Hib vaccine  Aged Out    Meningococcal (ACWY) vaccine  Aged Out    Pneumococcal 0-64 years Vaccine  Aged Out       Subjective:      Review of Systems   Constitutional: Negative for chills, diaphoresis, fatigue and fever. HENT: Positive for congestion, ear pain (bilat), postnasal drip, rhinorrhea and sinus pain (maxillary). Negative for hearing loss and sore throat. Eyes: Positive for discharge (watery). Respiratory: Negative for cough and shortness of breath. Cardiovascular: Negative for chest pain. Musculoskeletal: Positive for arthralgias and back pain (wakes her from sleep). Neurological: Positive for headaches (migraines happening 2 in last 10 days). Negative for dizziness and light-headedness. Psychiatric/Behavioral: Positive for dysphoric mood and sleep disturbance (multiple wakings). Negative for agitation, decreased concentration and suicidal ideas. The patient is nervous/anxious. Objective:     /68   Pulse 79   Temp 96.4 °F (35.8 °C)   Ht 5' 6\" (1.676 m)   Wt 155 lb 6.4 oz (70.5 kg)   SpO2 96%   BMI 25.08 kg/m²   Physical Exam  Vitals signs and nursing note reviewed. Constitutional:       Appearance: Normal appearance. She is not ill-appearing. HENT:      Right Ear: Tympanic membrane, ear canal and external ear normal. There is impacted cerumen. Left Ear: Tympanic membrane, ear canal and external ear normal. There is impacted cerumen. Nose: Congestion present. Mouth/Throat:      Mouth: Mucous membranes are moist.      Pharynx: No oropharyngeal exudate or posterior oropharyngeal erythema. Eyes:      General:         Right eye: No discharge. Left eye: No discharge. Conjunctiva/sclera: Conjunctivae normal.      Pupils: Pupils are equal, round, and reactive to light.    Neck: (DIFFERIN) 0.1 % cream     Sig: Apply topically nightly. Dispense:  45 g     Refill:  0       Patient given educationalmaterials - see patient instructions. Discussed use, benefit, and side effectsof prescribed medications. All patient questions answered. Pt voiced understanding. Reviewed health maintenance. Instructed to continue current medications, diet andexercise. Patient agreed with treatment plan. Follow up as directed.      Electronicallysigned by Sharan Montoya PA-C on 3/16/2021 at 11:44 AM

## 2021-03-17 ENCOUNTER — PATIENT MESSAGE (OUTPATIENT)
Dept: PRIMARY CARE CLINIC | Age: 41
End: 2021-03-17

## 2021-03-17 DIAGNOSIS — R92.8 ABNORMALITY OF RIGHT BREAST ON SCREENING MAMMOGRAM: Primary | ICD-10-CM

## 2021-03-17 DIAGNOSIS — R92.8 ABNORMAL MAMMOGRAM: ICD-10-CM

## 2021-04-20 ENCOUNTER — OFFICE VISIT (OUTPATIENT)
Dept: PRIMARY CARE CLINIC | Age: 41
End: 2021-04-20
Payer: COMMERCIAL

## 2021-04-20 VITALS
HEIGHT: 66 IN | BODY MASS INDEX: 24.75 KG/M2 | SYSTOLIC BLOOD PRESSURE: 116 MMHG | HEART RATE: 74 BPM | DIASTOLIC BLOOD PRESSURE: 68 MMHG | OXYGEN SATURATION: 98 % | WEIGHT: 154 LBS

## 2021-04-20 DIAGNOSIS — F43.21 GRIEF: Primary | ICD-10-CM

## 2021-04-20 DIAGNOSIS — F41.1 GAD (GENERALIZED ANXIETY DISORDER): ICD-10-CM

## 2021-04-20 PROBLEM — J06.9 VIRAL UPPER RESPIRATORY TRACT INFECTION: Status: RESOLVED | Noted: 2021-03-16 | Resolved: 2021-04-20

## 2021-04-20 PROBLEM — R59.1 LYMPHADENOPATHY: Status: RESOLVED | Noted: 2019-09-12 | Resolved: 2021-04-20

## 2021-04-20 PROCEDURE — 99214 OFFICE O/P EST MOD 30 MIN: CPT | Performed by: PHYSICIAN ASSISTANT

## 2021-04-20 PROCEDURE — 1036F TOBACCO NON-USER: CPT | Performed by: PHYSICIAN ASSISTANT

## 2021-04-20 PROCEDURE — G8420 CALC BMI NORM PARAMETERS: HCPCS | Performed by: PHYSICIAN ASSISTANT

## 2021-04-20 PROCEDURE — G8427 DOCREV CUR MEDS BY ELIG CLIN: HCPCS | Performed by: PHYSICIAN ASSISTANT

## 2021-04-20 RX ORDER — BUSPIRONE HYDROCHLORIDE 5 MG/1
5 TABLET ORAL 3 TIMES DAILY PRN
Qty: 90 TABLET | Refills: 0 | Status: SHIPPED | OUTPATIENT
Start: 2021-04-20 | End: 2021-05-20

## 2021-04-20 ASSESSMENT — ENCOUNTER SYMPTOMS
SHORTNESS OF BREATH: 0
BACK PAIN: 0
NAUSEA: 0
VOMITING: 0

## 2021-04-20 NOTE — PROGRESS NOTES
[address] [facility] PRIMARY CARE  [address] [address]  Dept: [phone]    Frederic Lakhani is a 35 y.o. female who presents today for her medical conditions/complaints as noted below. Chief Complaint   Patient presents with    Anxiety     Patients  passed and she is having alot of anxiety attacks. HPI:     HPI   passed away unexpectingly  Panic attacks happening around people mostly  Taking her Lexapro  Sleeping difficulties---only 3-4 hours a night. Just went back to work. Migraines stable.    No suicidal ideation, no self treatment with ETOH or rec drugs    LDL Cholesterol (mg/dL)   Date Value   07/09/2019 78     LDL Calculated (mg/dL)   Date Value   03/10/2018 98   03/21/2016 85       (goal LDL is <100)   AST (U/L)   Date Value   07/09/2019 12     ALT (U/L)   Date Value   07/09/2019 9     BUN (mg/dL)   Date Value   07/09/2019 13     BP Readings from Last 3 Encounters:   04/20/21 116/68   03/16/21 118/68   12/08/20 118/80          (goal 120/80)    Past Medical History:   Diagnosis Date    ADHD (attention deficit hyperactivity disorder)     Allergic rhinitis     Anxiety     Depression     High risk HPV infection 4/29/14    LGSIL Pap smear of vagina 4/29/14    Pituitary disorder (HCC)     pituitary tumor    PTSD (post-traumatic stress disorder)     Vaginal high risk human papillomavirus (HPV) DNA test positive     Cuff; s/p vag hyst      Past Surgical History:   Procedure Laterality Date    COLPOSCOPY  6/26/14    COLPOSCOPY  2/12/2015    due to lgsil on pap    HYSTERECTOMY, VAGINAL      OTHER SURGICAL HISTORY      Septaplasty    THROAT SURGERY  1980    cyst from throat       Family History   Problem Relation Age of Onset    Other Father         cholesterol    Heart Attack Father     Other Mother         low salt    Lupus Mother     Diabetes Maternal Grandmother     Heart Disease Maternal Grandmother     Lung Cancer Paternal Grandfather     Diabetes Paternal Grandmother     Other Paternal Grandmother         brething problems    Depression Sister        Social History     Tobacco Use    Smoking status: Never Smoker    Smokeless tobacco: Never Used   Substance Use Topics    Alcohol use: Yes     Alcohol/week: 0.0 standard drinks     Frequency: Monthly or less     Comment: SOCIAL      Current Outpatient Medications   Medication Sig Dispense Refill    busPIRone (BUSPAR) 5 MG tablet Take 1 tablet by mouth 3 times daily as needed (panic attacks) 90 tablet 0    escitalopram (LEXAPRO) 20 MG tablet Take 1 tablet by mouth daily 30 tablet 5    Ubrogepant (UBRELVY) 100 MG TABS Take 100 mg by mouth daily 3 tablet 0    adapalene (DIFFERIN) 0.1 % cream Apply topically nightly. 45 g 0    magnesium oxide (MAG-OX) 400 MG tablet Take 1 tablet by mouth once daily 30 tablet 0    promethazine (PHENERGAN) 12.5 MG tablet Take 1 tablet by mouth every 8 hours as needed for Nausea (moderate to severe headache) 14 tablet 2    aspirin-acetaminophen-caffeine (EXCEDRIN MIGRAINE) 250-250-65 MG per tablet Take 2 tablets by mouth as needed for Headaches (severe headaches) Take 2 tablets once every 24 hours (maximum: 2 tablets/day). Note: Limit use to no more than 2 days per week 28 tablet 2    diphenhydrAMINE (BENADRYL ALLERGY) 25 MG capsule Take 1 capsule by mouth 2 times daily as needed (Severe headache with nausea and vomiting.) 28 capsule 2    Multiple Vitamins-Minerals (MULTI-B-PLUS PO) Take by mouth      cabergoline (DOSTINEX) 0.5 MG tablet       celecoxib (CELEBREX) 200 MG capsule TAKE 1 CAPSULE BY MOUTH TWICE DAILY 180 capsule 1     No current facility-administered medications for this visit.       No Known Allergies    Health Maintenance   Topic Date Due    Hepatitis C screen  Never done    COVID-19 Vaccine (1) Never done    Diabetes screen  07/09/2022    Breast cancer screen  12/04/2022    Lipid screen  07/09/2024    DTaP/Tdap/Td vaccine (3 - Td) 08/22/2025    Flu vaccine  Completed    HIV screen  Completed    Hepatitis A vaccine  Aged Out    Hepatitis B vaccine  Aged Out    Hib vaccine  Aged Out    Meningococcal (ACWY) vaccine  Aged Out    Pneumococcal 0-64 years Vaccine  Aged Out       Subjective:      Review of Systems   Constitutional: Negative for activity change, appetite change and fatigue. Respiratory: Negative for shortness of breath. Cardiovascular: Negative for chest pain. Gastrointestinal: Negative for nausea and vomiting. Endocrine: Negative for cold intolerance and heat intolerance. Musculoskeletal: Negative for arthralgias, back pain, myalgias and neck pain. Neurological: Negative for dizziness, seizures, syncope, weakness, light-headedness and headaches. Psychiatric/Behavioral: Negative for agitation, behavioral problems, confusion, decreased concentration, dysphoric mood, hallucinations, self-injury, sleep disturbance and suicidal ideas. The patient is not nervous/anxious and is not hyperactive. Objective:     /68   Pulse 74   Ht 5' 6\" (1.676 m)   Wt 154 lb (69.9 kg)   SpO2 98%   BMI 24.86 kg/m²   Physical Exam  Vitals signs and nursing note reviewed. Constitutional:       Appearance: Normal appearance. Cardiovascular:      Rate and Rhythm: Normal rate and regular rhythm. Heart sounds: Normal heart sounds. Pulmonary:      Effort: Pulmonary effort is normal.      Breath sounds: Normal breath sounds. Neurological:      Mental Status: She is alert. Psychiatric:         Mood and Affect: Mood normal.         Assessment:       Diagnosis Orders   1. Grief     2. JOSHUA (generalized anxiety disorder)          Plan:    List of counselors given  Added Buspar   Continue Lexapro. Return in about 1 month (around 5/20/2021) for Anxiety. No orders of the defined types were placed in this encounter.     Orders Placed This Encounter   Medications    busPIRone (BUSPAR) 5 MG tablet     Sig: Take 1 tablet by mouth 3 times daily as needed (panic attacks)     Dispense:  90 tablet     Refill:  0       Patient given educational materials - see patient instructions. Discussed use, benefit, and side effects of prescribed medications. All patient questions answered. Pt voiced understanding. Reviewed health maintenance. Instructed to continue current medications, diet and exercise. Patient agreed with treatment plan. Follow up as directed.      Electronically signed by Geronimo Katz PA-C on 4/20/2021 at 7:53 AM

## 2021-04-26 NOTE — PROGRESS NOTES
Maxx Thornton M.D.            [address] [address] [facility],[address], [address] [facility]           Dept Phone: [phone]           Dept Fax:  [phone]           [place] Garay          Dept Phone: [phone]           Dept Fax:  [phone]      Chief Compliant:  Chief Complaint   Patient presents with    Pain     Lt knee        History of Present Illness: This is a 41 y.o. female who presents to the clinic today for evaluation / follow up of chronic left knee pain. Patient describes anterior knee pain when she sits for prolonged periods at times with her knees hyperflexed and going up and down stairs. She denies any prior injury to her knees. No injuries during her high school days. No recent injury. She has no complaints with her right knee. Patient does take Celebrex. Review of Systems   Constitutional: Negative for fever, chills, sweats. Eyes: Negative for changes in vision, or pain. HENT: Negative for ear ache, epistaxis, or sore throat. Respiratory/Cardio: Negative for Chest pain, palpitations, SOB, or cough. Gastrointestinal: Negative for abdominal pain, N/V/D. Genitourinary: Negative for dysuria, frequency, urgency, or hematuria. Neurological: Negative for headache, numbness, or weakness. Integumentary: Negative for rash, itching, laceration, or abrasion. Musculoskeletal: Positive for Pain (Lt knee)       Physical Exam:  Constitutional: Patient is oriented to person, place, and time. Patient appears well-developed and well nourished. HENT: Negative otherwise noted  Head: Normocephalic and Atraumatic  Nose: Normal  Eyes: Conjunctivae and EOM are normal  Neck: Normal range of motion Neck supple. Respiratory/Cardio: Effort normal. No respiratory distress.   Musculoskeletal: Examination of her left knee notes no obvious To Dr. Boggs please provide dosing/dosage instructions for magic mouth wash order.   Please see lab results    effusion that I appreciate. She hyperextends a few degrees. Flexes easily past 145 degrees. Good varus and valgus stability. Normal Lockman's nothing on Omar's she does have patellofemoral pain with compression and and slight subluxation. She is non-apprehensive however. Neurological: Patient is alert and oriented to person, place, and time. Normal strenght. No sensory deficit. Skin: Skin is warm and dry  Psychiatric: Behavior is normal. Thought content normal.  Nursing note and vitals reviewed. Labs and Imaging:     XR taken today:  Xr Knee Left (1-2 Views)    Result Date: 10/9/2020  X-ray taken today reviewed by me show standing AP of both knees and a lateral of the left knee. Patient has no abnormal findings in regards to her joint spaces on the left knee. She does have a effusion present. Lateral view notes that her patella rides slightly high but there is plenty of patellofemoral base. No acute process is noted. Patient's right knee is unremarkable        No orders of the defined types were placed in this encounter. Assessment and Plan:  1. Acute pain of left knee    2. Patellofemoral chondrosis left knee        This is a 41 y.o. female who presents to the clinic today for evaluation / follow up of tell femoral chondrosis left knee. Past History:    Current Outpatient Medications:     methylPREDNISolone (MEDROL DOSEPACK) 4 MG tablet, Take by mouth., Disp: 1 kit, Rfl: 0    promethazine (PHENERGAN) 12.5 MG tablet, Take 1 tablet by mouth every 8 hours as needed for Nausea (moderate to severe headache), Disp: 14 tablet, Rfl: 2    aspirin-acetaminophen-caffeine (EXCEDRIN MIGRAINE) 650-686-30 MG per tablet, Take 2 tablets by mouth as needed for Headaches (severe headaches) Take 2 tablets once every 24 hours (maximum: 2 tablets/day).   Note: Limit use to no more than 2 days per week, Disp: 28 tablet, Rfl: 2    diphenhydrAMINE (BENADRYL ALLERGY) 25 MG capsule, Take 1 capsule by

## 2021-05-18 RX ORDER — ESCITALOPRAM OXALATE 20 MG/1
20 TABLET ORAL DAILY
Qty: 30 TABLET | Refills: 5 | Status: CANCELLED | OUTPATIENT
Start: 2021-05-18

## 2021-05-21 ENCOUNTER — OFFICE VISIT (OUTPATIENT)
Dept: PRIMARY CARE CLINIC | Age: 41
End: 2021-05-21
Payer: COMMERCIAL

## 2021-05-21 VITALS
OXYGEN SATURATION: 98 % | SYSTOLIC BLOOD PRESSURE: 124 MMHG | HEART RATE: 71 BPM | BODY MASS INDEX: 24.75 KG/M2 | HEIGHT: 66 IN | DIASTOLIC BLOOD PRESSURE: 78 MMHG | WEIGHT: 154 LBS

## 2021-05-21 DIAGNOSIS — G43.009 MIGRAINE WITHOUT AURA AND WITHOUT STATUS MIGRAINOSUS, NOT INTRACTABLE: Primary | ICD-10-CM

## 2021-05-21 DIAGNOSIS — F41.1 GENERALIZED ANXIETY DISORDER: ICD-10-CM

## 2021-05-21 DIAGNOSIS — F43.21 GRIEF: ICD-10-CM

## 2021-05-21 PROBLEM — R40.0 DAYTIME SOMNOLENCE: Status: RESOLVED | Noted: 2019-09-12 | Resolved: 2021-05-21

## 2021-05-21 PROBLEM — Z77.22 SECOND HAND TOBACCO SMOKE EXPOSURE: Status: RESOLVED | Noted: 2018-03-06 | Resolved: 2021-05-21

## 2021-05-21 PROCEDURE — 1036F TOBACCO NON-USER: CPT | Performed by: PHYSICIAN ASSISTANT

## 2021-05-21 PROCEDURE — G8427 DOCREV CUR MEDS BY ELIG CLIN: HCPCS | Performed by: PHYSICIAN ASSISTANT

## 2021-05-21 PROCEDURE — G8420 CALC BMI NORM PARAMETERS: HCPCS | Performed by: PHYSICIAN ASSISTANT

## 2021-05-21 PROCEDURE — 99213 OFFICE O/P EST LOW 20 MIN: CPT | Performed by: PHYSICIAN ASSISTANT

## 2021-05-21 RX ORDER — UBROGEPANT 100 MG/1
TABLET ORAL
Qty: 10 TABLET | Refills: 2 | Status: ON HOLD | OUTPATIENT
Start: 2021-05-21 | End: 2021-11-24

## 2021-05-21 RX ORDER — ESCITALOPRAM OXALATE 20 MG/1
20 TABLET ORAL DAILY
Qty: 90 TABLET | Refills: 3 | Status: SHIPPED | OUTPATIENT
Start: 2021-05-21 | End: 2021-05-25 | Stop reason: SDUPTHER

## 2021-05-21 SDOH — ECONOMIC STABILITY: FOOD INSECURITY: WITHIN THE PAST 12 MONTHS, THE FOOD YOU BOUGHT JUST DIDN'T LAST AND YOU DIDN'T HAVE MONEY TO GET MORE.: NEVER TRUE

## 2021-05-21 SDOH — ECONOMIC STABILITY: FOOD INSECURITY: WITHIN THE PAST 12 MONTHS, YOU WORRIED THAT YOUR FOOD WOULD RUN OUT BEFORE YOU GOT MONEY TO BUY MORE.: NEVER TRUE

## 2021-05-21 ASSESSMENT — ENCOUNTER SYMPTOMS
NAUSEA: 0
BACK PAIN: 0
SINUS PRESSURE: 0
EYE PAIN: 0
VOMITING: 0
RHINORRHEA: 0
PHOTOPHOBIA: 0
RESPIRATORY NEGATIVE: 1
SHORTNESS OF BREATH: 0

## 2021-05-21 NOTE — PROGRESS NOTES
[address] [facility] PRIMARY CARE  [address] [place] [id number]  Dept: [phone]    Jaun Fabian is a 35 y.o. female who presents today for her medical conditions/complaints as noted below. Chief Complaint   Patient presents with    Medication Check     2 month check up on Lexapro, patient said medication is helping    Migraine     Pt said her migraines have been better. Patient said she is having around 3 migraines a month       HPI:     HPI  Lexapro helps  Buspar helps with panic--Had 4 attacks in last month. Is seeing counselor. Carlos Yung helped migraines  Trigger is barometric pressure.     No red flags sxs and Endo monitors the pituitary tumor  LDL Cholesterol (mg/dL)   Date Value   07/09/2019 78     LDL Calculated (mg/dL)   Date Value   03/10/2018 98   03/21/2016 85       (goal LDL is <100)   AST (U/L)   Date Value   07/09/2019 12     ALT (U/L)   Date Value   07/09/2019 9     BUN (mg/dL)   Date Value   07/09/2019 13     BP Readings from Last 3 Encounters:   05/21/21 124/78   04/20/21 116/68   03/16/21 118/68          (goal 120/80)    Past Medical History:   Diagnosis Date    ADHD (attention deficit hyperactivity disorder)     Allergic rhinitis     Anxiety     Depression     High risk HPV infection 4/29/14    LGSIL Pap smear of vagina 4/29/14    Pituitary disorder (HCC)     pituitary tumor    PTSD (post-traumatic stress disorder)     Vaginal high risk human papillomavirus (HPV) DNA test positive     Cuff; s/p vag hyst      Past Surgical History:   Procedure Laterality Date    COLPOSCOPY  6/26/14    COLPOSCOPY  2/12/2015    due to lgsil on pap    HYSTERECTOMY, VAGINAL      OTHER SURGICAL HISTORY      Septaplasty    THROAT SURGERY  1980    cyst from throat       Family History   Problem Relation Age of Onset    Other Father         cholesterol    Heart Attack Father     Other Mother         low salt    Lupus Mother     Diabetes Maternal Grandmother     Heart Disease Maternal Grandmother     Lung Cancer Paternal Grandfather     Diabetes Paternal Grandmother     Other Paternal Grandmother         brething problems    Depression Sister        Social History     Tobacco Use    Smoking status: Never Smoker    Smokeless tobacco: Never Used   Substance Use Topics    Alcohol use: Yes     Alcohol/week: 0.0 standard drinks     Comment: SOCIAL      Current Outpatient Medications   Medication Sig Dispense Refill    Ubrogepant (UBRELVY) 100 MG TABS Take one tablet at onset of HA and may take another 2 hours later 10 tablet 2    escitalopram (LEXAPRO) 20 MG tablet Take 1 tablet by mouth daily 90 tablet 3    celecoxib (CELEBREX) 200 MG capsule TAKE 1 CAPSULE BY MOUTH TWICE DAILY 180 capsule 1    adapalene (DIFFERIN) 0.1 % cream Apply topically nightly. 45 g 0    magnesium oxide (MAG-OX) 400 MG tablet Take 1 tablet by mouth once daily 30 tablet 0    promethazine (PHENERGAN) 12.5 MG tablet Take 1 tablet by mouth every 8 hours as needed for Nausea (moderate to severe headache) 14 tablet 2    aspirin-acetaminophen-caffeine (EXCEDRIN MIGRAINE) 250-250-65 MG per tablet Take 2 tablets by mouth as needed for Headaches (severe headaches) Take 2 tablets once every 24 hours (maximum: 2 tablets/day). Note: Limit use to no more than 2 days per week 28 tablet 2    diphenhydrAMINE (BENADRYL ALLERGY) 25 MG capsule Take 1 capsule by mouth 2 times daily as needed (Severe headache with nausea and vomiting.) 28 capsule 2    Multiple Vitamins-Minerals (MULTI-B-PLUS PO) Take by mouth      cabergoline (DOSTINEX) 0.5 MG tablet        No current facility-administered medications for this visit.      No Known Allergies    Health Maintenance   Topic Date Due    Hepatitis C screen  Never done    COVID-19 Vaccine (1) Never done    Breast cancer screen  12/04/2022    Lipid screen  07/09/2024    DTaP/Tdap/Td vaccine (3 - Td) 08/22/2025    Flu vaccine  Completed    HIV screen  Completed    Hepatitis A vaccine  Aged Out    Hepatitis B vaccine  Aged Out    Hib vaccine  Aged Out    Meningococcal (ACWY) vaccine  Aged Out    Pneumococcal 0-64 years Vaccine  Aged Out       Subjective:      Review of Systems   Constitutional: Negative for activity change, appetite change, chills, diaphoresis, fatigue, fever and unexpected weight change. HENT: Negative for congestion, dental problem, ear pain, nosebleeds, postnasal drip, rhinorrhea, sinus pressure and tinnitus. Eyes: Negative for photophobia, pain and visual disturbance. Respiratory: Negative. Negative for shortness of breath. Cardiovascular: Negative. Negative for chest pain. Gastrointestinal: Negative for nausea and vomiting. Endocrine: Negative for cold intolerance, heat intolerance, polydipsia, polyphagia and polyuria. Musculoskeletal: Negative for arthralgias, back pain, myalgias, neck pain and neck stiffness. Skin: Negative for rash. Allergic/Immunologic: Negative for environmental allergies. Neurological: Positive for headaches (reduced). Negative for dizziness, tremors, seizures, syncope, facial asymmetry, speech difficulty, weakness, light-headedness and numbness. Hematological: Negative for adenopathy. Psychiatric/Behavioral: Positive for dysphoric mood (grief). Negative for agitation, behavioral problems, confusion, decreased concentration, hallucinations, self-injury, sleep disturbance and suicidal ideas. The patient is not nervous/anxious and is not hyperactive. Objective:     /78   Pulse 71   Ht 5' 6\" (1.676 m)   Wt 154 lb (69.9 kg)   SpO2 98%   BMI 24.86 kg/m²   Physical Exam  Vitals and nursing note reviewed. Constitutional:       Appearance: Normal appearance. Cardiovascular:      Rate and Rhythm: Normal rate and regular rhythm. Heart sounds: Normal heart sounds. Pulmonary:      Effort: Pulmonary effort is normal.      Breath sounds: Normal breath sounds.  No wheezing, rhonchi or rales. Neurological:      Mental Status: She is alert. Assessment:       Diagnosis Orders   1. Migraine without aura and without status migrainosus, not intractable     2. Generalized anxiety disorder     3. Grief          Plan:    Continue the Lexapro, Buspar and counseling  Nasal saline for prevention of HA  Trail Mg 400mg daily for prevention of HA  Ubrelvy prescribed and give co pay card    Return in about 6 months (around 11/21/2021) for recheck. No orders of the defined types were placed in this encounter. Orders Placed This Encounter   Medications    Ubrogepant (UBRELVY) 100 MG TABS     Sig: Take one tablet at onset of HA and may take another 2 hours later     Dispense:  10 tablet     Refill:  2    escitalopram (LEXAPRO) 20 MG tablet     Sig: Take 1 tablet by mouth daily     Dispense:  90 tablet     Refill:  3       Patient given educational materials - see patient instructions. Discussed use, benefit, and side effects of prescribed medications. All patient questions answered. Pt voiced understanding. Reviewed health maintenance. Instructed to continue current medications, diet and exercise. Patient agreed with treatment plan. Follow up as directed.      Electronically signed by Deirdre Patton PA-C on 5/21/2021 at 7:57 AM

## 2021-05-25 RX ORDER — ESCITALOPRAM OXALATE 20 MG/1
20 TABLET ORAL DAILY
Qty: 90 TABLET | Refills: 3 | Status: SHIPPED | OUTPATIENT
Start: 2021-05-25 | End: 2021-10-19 | Stop reason: ALTCHOICE

## 2021-06-11 ENCOUNTER — HOSPITAL ENCOUNTER (OUTPATIENT)
Dept: WOMENS IMAGING | Age: 41
Discharge: HOME | End: 2021-06-13
Payer: COMMERCIAL

## 2021-06-11 DIAGNOSIS — R92.8 ABNORMALITY OF RIGHT BREAST ON SCREENING MAMMOGRAM: ICD-10-CM

## 2021-06-11 PROCEDURE — 76642 ULTRASOUND BREAST LIMITED: CPT

## 2021-06-11 PROCEDURE — G0279 TOMOSYNTHESIS, MAMMO: HCPCS

## 2021-06-22 ENCOUNTER — TELEPHONE (OUTPATIENT)
Dept: PRIMARY CARE CLINIC | Age: 41
End: 2021-06-22

## 2021-06-22 NOTE — TELEPHONE ENCOUNTER
Pt faxed over her FMLA 6 month up date p-work last week. Asking if you received it yet, or can let you know when you get it? Thankyou! Faxed it to the [phone].

## 2021-06-22 NOTE — TELEPHONE ENCOUNTER
Patients FMLA was filled out and faxed today. I tried to call patient back but her her phone is saying she is not accepting calls.

## 2021-10-19 ENCOUNTER — OFFICE VISIT (OUTPATIENT)
Dept: PRIMARY CARE CLINIC | Age: 41
End: 2021-10-19
Payer: COMMERCIAL

## 2021-10-19 VITALS
HEART RATE: 68 BPM | OXYGEN SATURATION: 99 % | DIASTOLIC BLOOD PRESSURE: 80 MMHG | SYSTOLIC BLOOD PRESSURE: 126 MMHG | BODY MASS INDEX: 24.75 KG/M2 | HEIGHT: 66 IN | WEIGHT: 154 LBS

## 2021-10-19 DIAGNOSIS — R42 DIZZINESS: Primary | ICD-10-CM

## 2021-10-19 DIAGNOSIS — F41.9 ANXIETY: ICD-10-CM

## 2021-10-19 PROCEDURE — 99214 OFFICE O/P EST MOD 30 MIN: CPT | Performed by: PHYSICIAN ASSISTANT

## 2021-10-19 RX ORDER — BUSPIRONE HYDROCHLORIDE 5 MG/1
5 TABLET ORAL 3 TIMES DAILY
Qty: 90 TABLET | Refills: 0 | Status: SHIPPED | OUTPATIENT
Start: 2021-10-19 | End: 2022-01-17

## 2021-10-19 ASSESSMENT — ENCOUNTER SYMPTOMS
COUGH: 0
SHORTNESS OF BREATH: 0
WHEEZING: 0
VOMITING: 0
ABDOMINAL PAIN: 0
CONSTIPATION: 0
NAUSEA: 0
DIARRHEA: 1
BACK PAIN: 0

## 2021-10-19 NOTE — PROGRESS NOTES
sodium    Depression Sister     Diabetes Maternal Grandmother     Heart Disease Maternal Grandmother     Diabetes Paternal Grandmother     Other Paternal Grandmother         brething problems    Lung Cancer Paternal Grandfather        Social History     Tobacco Use    Smoking status: Never Smoker    Smokeless tobacco: Never Used   Substance Use Topics    Alcohol use: Yes     Alcohol/week: 0.0 standard drinks     Comment: SOCIAL      Current Outpatient Medications   Medication Sig Dispense Refill    busPIRone (BUSPAR) 5 MG tablet Take 1 tablet by mouth 3 times daily 90 tablet 0    Ubrogepant (UBRELVY) 100 MG TABS Take one tablet at onset of HA and may take another 2 hours later 10 tablet 2    promethazine (PHENERGAN) 12.5 MG tablet Take 1 tablet by mouth every 8 hours as needed for Nausea (moderate to severe headache) 14 tablet 2    aspirin-acetaminophen-caffeine (EXCEDRIN MIGRAINE) 250-250-65 MG per tablet Take 2 tablets by mouth as needed for Headaches (severe headaches) Take 2 tablets once every 24 hours (maximum: 2 tablets/day). Note: Limit use to no more than 2 days per week 28 tablet 2    diphenhydrAMINE (BENADRYL ALLERGY) 25 MG capsule Take 1 capsule by mouth 2 times daily as needed (Severe headache with nausea and vomiting.) 28 capsule 2    Multiple Vitamins-Minerals (MULTI-B-PLUS PO) Take by mouth      adapalene (DIFFERIN) 0.1 % cream Apply topically nightly. 45 g 0     No current facility-administered medications for this visit.      No Known Allergies    Health Maintenance   Topic Date Due    Hepatitis C screen  Never done    COVID-19 Vaccine (1) Never done    Flu vaccine (1) 09/01/2021    Breast cancer screen  06/11/2023    Lipid screen  07/09/2024    DTaP/Tdap/Td vaccine (3 - Td or Tdap) 08/22/2025    HIV screen  Completed    Hepatitis A vaccine  Aged Out    Hepatitis B vaccine  Aged Out    Hib vaccine  Aged Out    Meningococcal (ACWY) vaccine  Aged Out    Pneumococcal 0-64 years Vaccine  Aged Out       Subjective:      Review of Systems   Constitutional: Positive for fatigue. Negative for chills, diaphoresis, fever and unexpected weight change. HENT: Negative. Respiratory: Negative for cough, shortness of breath and wheezing. Cardiovascular: Negative for chest pain. Gastrointestinal: Positive for diarrhea. Negative for abdominal pain, constipation, nausea and vomiting. Endocrine: Negative. Genitourinary: Negative. Musculoskeletal: Negative for back pain and neck pain. Allergic/Immunologic: Positive for environmental allergies. Neurological: Positive for dizziness and headaches (her normal). Negative for syncope, weakness and numbness. Psychiatric/Behavioral: Positive for sleep disturbance. The patient is nervous/anxious. Objective:     /80   Pulse 68   Ht 5' 6\" (1.676 m)   Wt 154 lb (69.9 kg)   SpO2 99%   BMI 24.86 kg/m²   Physical Exam  Vitals and nursing note reviewed. Constitutional:       General: She is not in acute distress. Appearance: Normal appearance. She is not ill-appearing. HENT:      Right Ear: Tympanic membrane, ear canal and external ear normal.      Left Ear: Tympanic membrane, ear canal and external ear normal.      Nose: Nose normal.      Mouth/Throat:      Mouth: Mucous membranes are moist.      Pharynx: No oropharyngeal exudate or posterior oropharyngeal erythema. Eyes:      General:         Right eye: No discharge. Left eye: No discharge. Conjunctiva/sclera: Conjunctivae normal.      Pupils: Pupils are equal, round, and reactive to light. Neck:      Thyroid: No thyroid mass, thyromegaly or thyroid tenderness. Vascular: No carotid bruit. Cardiovascular:      Rate and Rhythm: Normal rate and regular rhythm. Heart sounds: Normal heart sounds. Pulmonary:      Effort: Pulmonary effort is normal.      Breath sounds: Normal breath sounds.    Musculoskeletal:      Cervical back: Full passive range of motion without pain. Neurological:      Mental Status: She is alert and oriented to person, place, and time. Psychiatric:         Attention and Perception: Attention normal.         Mood and Affect: Mood is anxious. Speech: Speech normal.         Behavior: Behavior normal.         Thought Content: Thought content normal.         Cognition and Memory: Cognition normal.         Judgment: Judgment normal.         Assessment:       Diagnosis Orders   1. Dizziness  CBC Auto Differential    TSH without Reflex    Magnesium    Comprehensive Metabolic Panel, Fasting   2. Anxiety  busPIRone (BUSPAR) 5 MG tablet        Plan:    BW ordered  Find a stress outlet   Start Buspar for anxiety  Return in about 1 month (around 11/19/2021) for Anxiety--1/2 hour. Orders Placed This Encounter   Procedures    CBC Auto Differential     Standing Status:   Future     Standing Expiration Date:   10/19/2022    TSH without Reflex     Standing Status:   Future     Standing Expiration Date:   10/19/2022    Magnesium     Standing Status:   Future     Standing Expiration Date:   10/19/2022    Comprehensive Metabolic Panel, Fasting     Standing Status:   Future     Standing Expiration Date:   10/19/2022     Orders Placed This Encounter   Medications    busPIRone (BUSPAR) 5 MG tablet     Sig: Take 1 tablet by mouth 3 times daily     Dispense:  90 tablet     Refill:  0       Patient given educational materials - see patient instructions. Discussed use, benefit, and side effects of prescribed medications. All patient questions answered. Pt voiced understanding. Reviewed health maintenance. Instructed to continue current medications, diet and exercise. Patient agreed with treatment plan. Follow up as directed.      Electronically signed by Carmelita Glass PA-C on 10/19/2021 at 6:04 PM

## 2021-10-27 DIAGNOSIS — R42 DIZZINESS: ICD-10-CM

## 2021-10-27 LAB
ABSOLUTE EOS #: 0.08 K/UL (ref 0–0.44)
ABSOLUTE IMMATURE GRANULOCYTE: <0.03 K/UL (ref 0–0.3)
ABSOLUTE LYMPH #: 1.76 K/UL (ref 1.1–3.7)
ABSOLUTE MONO #: 0.66 K/UL (ref 0.1–1.2)
ALBUMIN SERPL-MCNC: 4.2 G/DL (ref 3.5–5.2)
ALBUMIN/GLOBULIN RATIO: 1.2 (ref 1–2.5)
ALP BLD-CCNC: 83 U/L (ref 35–104)
ALT SERPL-CCNC: 9 U/L (ref 5–33)
ANION GAP SERPL CALCULATED.3IONS-SCNC: 14 MMOL/L (ref 9–17)
AST SERPL-CCNC: 13 U/L
BASOPHILS # BLD: 1 % (ref 0–2)
BASOPHILS ABSOLUTE: 0.03 K/UL (ref 0–0.2)
BILIRUB SERPL-MCNC: 0.37 MG/DL (ref 0.3–1.2)
BUN BLDV-MCNC: 10 MG/DL (ref 6–20)
BUN/CREAT BLD: NORMAL (ref 9–20)
CALCIUM SERPL-MCNC: 9.3 MG/DL (ref 8.6–10.4)
CHLORIDE BLD-SCNC: 101 MMOL/L (ref 98–107)
CO2: 24 MMOL/L (ref 20–31)
CREAT SERPL-MCNC: 0.55 MG/DL (ref 0.5–0.9)
DIFFERENTIAL TYPE: NORMAL
EOSINOPHILS RELATIVE PERCENT: 1 % (ref 1–4)
GFR AFRICAN AMERICAN: >60 ML/MIN
GFR NON-AFRICAN AMERICAN: >60 ML/MIN
GFR SERPL CREATININE-BSD FRML MDRD: NORMAL ML/MIN/{1.73_M2}
GFR SERPL CREATININE-BSD FRML MDRD: NORMAL ML/MIN/{1.73_M2}
GLUCOSE FASTING: 91 MG/DL (ref 70–99)
HCT VFR BLD CALC: 42.6 % (ref 36.3–47.1)
HEMOGLOBIN: 13.1 G/DL (ref 11.9–15.1)
IMMATURE GRANULOCYTES: 0 %
LYMPHOCYTES # BLD: 27 % (ref 24–43)
MAGNESIUM: 2 MG/DL (ref 1.6–2.6)
MCH RBC QN AUTO: 30.8 PG (ref 25.2–33.5)
MCHC RBC AUTO-ENTMCNC: 30.8 G/DL (ref 28.4–34.8)
MCV RBC AUTO: 100.2 FL (ref 82.6–102.9)
MONOCYTES # BLD: 10 % (ref 3–12)
NRBC AUTOMATED: 0 PER 100 WBC
PDW BLD-RTO: 12.5 % (ref 11.8–14.4)
PLATELET # BLD: 420 K/UL (ref 138–453)
PLATELET ESTIMATE: NORMAL
PMV BLD AUTO: 10.5 FL (ref 8.1–13.5)
POTASSIUM SERPL-SCNC: 3.9 MMOL/L (ref 3.7–5.3)
RBC # BLD: 4.25 M/UL (ref 3.95–5.11)
RBC # BLD: NORMAL 10*6/UL
SEG NEUTROPHILS: 61 % (ref 36–65)
SEGMENTED NEUTROPHILS ABSOLUTE COUNT: 4.1 K/UL (ref 1.5–8.1)
SODIUM BLD-SCNC: 139 MMOL/L (ref 135–144)
TOTAL PROTEIN: 7.7 G/DL (ref 6.4–8.3)
TSH SERPL DL<=0.05 MIU/L-ACNC: 2.46 MIU/L (ref 0.3–5)
WBC # BLD: 6.6 K/UL (ref 3.5–11.3)
WBC # BLD: NORMAL 10*3/UL

## 2021-11-18 ENCOUNTER — TELEPHONE (OUTPATIENT)
Dept: PRIMARY CARE CLINIC | Age: 41
End: 2021-11-18

## 2021-11-18 ENCOUNTER — NURSE TRIAGE (OUTPATIENT)
Dept: OTHER | Facility: CLINIC | Age: 41
End: 2021-11-18

## 2021-11-18 NOTE — TELEPHONE ENCOUNTER
Reason for Disposition   [1] Small swelling or lump AND [2] unexplained AND [3] present > 1 week    Answer Assessment - Initial Assessment Questions  1. APPEARANCE of SWELLING: \"What does it look like? \" (e.g., lymph node, insect bite, mole)      Raised, skin colored,  Smooth    2. SIZE: \"How large is the swelling? \" (inches, cm or compare to coins)      4.5 inches wide and 2 inches long    3. LOCATION: \"Where is the swelling located? \"      Slightly above the bend in right upper leg(near hip)    4. ONSET: \"When did the swelling start? \"      Just noticed today. 5. PAIN: \"Is it painful? \" If so, ask: \"How much? \"      No    6. ITCH: \"Does it itch? \" If so, ask: \"How much? \"      No    7. CAUSE: \"What do you think caused the swelling? \"      I have no idea    8. OTHER SYMPTOMS: \"Do you have any other symptoms? \" (e.g., fever)      None that I know of. Protocols used: SKIN LUMP OR LOCALIZED SWELLING-ADULT-    Brief description of triage: lump in lower abd.  4 x 2 inches    Triage indicates for patient to see PCP within 3 days. Transferred patient to [facility] advice provided, patient verbalizes understanding; denies any other questions or concerns; instructed to call back for any new or worsening symptoms. Attention Provider: Thank you for allowing me to participate in the care of your patient. The patient was connected to triage in response to symptoms provided. Please do not respond through this encounter as the response is not directed to a shared pool.

## 2021-11-23 ENCOUNTER — OFFICE VISIT (OUTPATIENT)
Dept: PRIMARY CARE CLINIC | Age: 41
End: 2021-11-23
Payer: COMMERCIAL

## 2021-11-23 VITALS
WEIGHT: 153 LBS | DIASTOLIC BLOOD PRESSURE: 84 MMHG | OXYGEN SATURATION: 96 % | TEMPERATURE: 99.5 F | BODY MASS INDEX: 24.59 KG/M2 | HEIGHT: 66 IN | SYSTOLIC BLOOD PRESSURE: 118 MMHG | HEART RATE: 90 BPM

## 2021-11-23 DIAGNOSIS — J02.9 PHARYNGITIS, UNSPECIFIED ETIOLOGY: Primary | ICD-10-CM

## 2021-11-23 DIAGNOSIS — J02.9 PHARYNGITIS, UNSPECIFIED ETIOLOGY: ICD-10-CM

## 2021-11-23 LAB
INFLUENZA A ANTIBODY: NEGATIVE
INFLUENZA B ANTIBODY: NEGATIVE
MONONUCLEOSIS SCREEN: NEGATIVE
S PYO AG THROAT QL: NORMAL

## 2021-11-23 PROCEDURE — 87880 STREP A ASSAY W/OPTIC: CPT | Performed by: PHYSICIAN ASSISTANT

## 2021-11-23 PROCEDURE — 87804 INFLUENZA ASSAY W/OPTIC: CPT | Performed by: PHYSICIAN ASSISTANT

## 2021-11-23 PROCEDURE — 99213 OFFICE O/P EST LOW 20 MIN: CPT | Performed by: PHYSICIAN ASSISTANT

## 2021-11-23 ASSESSMENT — ENCOUNTER SYMPTOMS
SORE THROAT: 1
WHEEZING: 0
RHINORRHEA: 0
VOMITING: 1
COUGH: 1
SHORTNESS OF BREATH: 0

## 2021-11-23 ASSESSMENT — PATIENT HEALTH QUESTIONNAIRE - PHQ9
SUM OF ALL RESPONSES TO PHQ9 QUESTIONS 1 & 2: 0
SUM OF ALL RESPONSES TO PHQ QUESTIONS 1-9: 0
1. LITTLE INTEREST OR PLEASURE IN DOING THINGS: 0
SUM OF ALL RESPONSES TO PHQ QUESTIONS 1-9: 0
2. FEELING DOWN, DEPRESSED OR HOPELESS: 0
SUM OF ALL RESPONSES TO PHQ QUESTIONS 1-9: 0

## 2021-11-23 NOTE — PROGRESS NOTES
[facility]  [address]  Phone: [phone]  Fax: [phone]    Brooks Richards is a 35 y.o. female who presents today for her medical conditions/complaintsas noted below. Chief Complaint   Patient presents with    Other     Pt states she believes she has the flu or strep throat. Pt complains of really bad sore throat, fever, chills, and throwing up this morning. x2-3 days. States she went to get a rapid test that was negative.  1 Month Follow-Up     Anxiety         HPI:     HPI  Had a rapid Covid yesterday which was negative at [facility]. Did not check for anything else. They gave her a Zpak    Taking OTC Tyelnol severe cold and flu   Current Outpatient Medications   Medication Sig Dispense Refill    busPIRone (BUSPAR) 5 MG tablet Take 1 tablet by mouth 3 times daily 90 tablet 0    Ubrogepant (UBRELVY) 100 MG TABS Take one tablet at onset of HA and may take another 2 hours later 10 tablet 2    adapalene (DIFFERIN) 0.1 % cream Apply topically nightly. 45 g 0    promethazine (PHENERGAN) 12.5 MG tablet Take 1 tablet by mouth every 8 hours as needed for Nausea (moderate to severe headache) 14 tablet 2    aspirin-acetaminophen-caffeine (EXCEDRIN MIGRAINE) 250-250-65 MG per tablet Take 2 tablets by mouth as needed for Headaches (severe headaches) Take 2 tablets once every 24 hours (maximum: 2 tablets/day). Note: Limit use to no more than 2 days per week 28 tablet 2    diphenhydrAMINE (BENADRYL ALLERGY) 25 MG capsule Take 1 capsule by mouth 2 times daily as needed (Severe headache with nausea and vomiting.) 28 capsule 2    Multiple Vitamins-Minerals (MULTI-B-PLUS PO) Take by mouth       No current facility-administered medications for this visit. No Known Allergies    Subjective:      Review of Systems   Constitutional: Positive for chills, diaphoresis and fever (max 103.7). HENT: Positive for sore throat.  Negative for congestion and rhinorrhea. Respiratory: Positive for cough. Negative for shortness of breath and wheezing. Cardiovascular: Negative for chest pain. Gastrointestinal: Positive for vomiting. Musculoskeletal: Positive for arthralgias and myalgias. Neurological: Positive for headaches. Objective:     /84   Pulse 90   Temp 99.5 °F (37.5 °C)   Ht 5' 6\" (1.676 m) Comment: Pt stated  Wt 153 lb (69.4 kg)   SpO2 96%   BMI 24.69 kg/m²   Physical Exam  Vitals and nursing note reviewed. Constitutional:       Appearance: She is ill-appearing. HENT:      Right Ear: Tympanic membrane, ear canal and external ear normal.      Left Ear: Tympanic membrane, ear canal and external ear normal.      Nose: Nose normal.      Mouth/Throat:      Pharynx: Posterior oropharyngeal erythema present. No oropharyngeal exudate. Tonsils: Tonsillar exudate present. No tonsillar abscesses. 2+ on the right. 2+ on the left. Eyes:      General:         Right eye: No discharge. Left eye: No discharge. Cardiovascular:      Rate and Rhythm: Normal rate. Heart sounds: Normal heart sounds. Pulmonary:      Effort: Pulmonary effort is normal.      Breath sounds: Normal breath sounds. No wheezing, rhonchi or rales. Skin:     Findings: No rash. Neurological:      Mental Status: She is alert and oriented to person, place, and time. Psychiatric:         Mood and Affect: Mood normal.         Assessment:       Diagnosis Orders   1. Pharyngitis, unspecified etiology  POCT rapid strep A    POCT Influenza A/B    Mononucleosis Screen        Plan:    Need a temp documented  Strep test today--negative  Flu test today---negative  Monospot ordered--have drawn in room  Salt water garlges  Ibuprofen for swelling and pain  Work slip for off until testing is back     Return if symptoms worsen or fail to improve.     Orders Placed This Encounter   Procedures    Mononucleosis Screen     Standing Status:   Future     Standing Expiration Date:   11/23/2022    POCT rapid strep A    POCT Influenza A/B     No orders of the defined types were placed in this encounter.           Electronically signed by Reece Claude, PA-Con 11/23/2021 at 9:27 AM

## 2021-11-24 ENCOUNTER — HOSPITAL ENCOUNTER (OUTPATIENT)
Age: 41
Setting detail: OBSERVATION
Discharge: HOME | End: 2021-11-25
Attending: EMERGENCY MEDICINE | Admitting: INTERNAL MEDICINE
Payer: COMMERCIAL

## 2021-11-24 ENCOUNTER — APPOINTMENT (OUTPATIENT)
Dept: CT IMAGING | Age: 41
End: 2021-11-24
Payer: COMMERCIAL

## 2021-11-24 DIAGNOSIS — J03.90 ACUTE TONSILLITIS, UNSPECIFIED ETIOLOGY: Primary | ICD-10-CM

## 2021-11-24 DIAGNOSIS — E86.0 DEHYDRATION: ICD-10-CM

## 2021-11-24 PROBLEM — J02.9 TONSILLOPHARYNGITIS: Status: ACTIVE | Noted: 2021-11-24

## 2021-11-24 LAB
ABSOLUTE EOS #: 0 K/UL (ref 0–0.4)
ABSOLUTE IMMATURE GRANULOCYTE: ABNORMAL K/UL (ref 0–0.3)
ABSOLUTE LYMPH #: 0.9 K/UL (ref 1–4.8)
ABSOLUTE MONO #: 1.2 K/UL (ref 0.1–1.2)
ANION GAP SERPL CALCULATED.3IONS-SCNC: 13 MMOL/L (ref 9–17)
BASOPHILS # BLD: 0 % (ref 0–2)
BASOPHILS ABSOLUTE: 0.1 K/UL (ref 0–0.2)
BUN BLDV-MCNC: 8 MG/DL (ref 6–20)
BUN/CREAT BLD: ABNORMAL (ref 9–20)
CALCIUM SERPL-MCNC: 8.9 MG/DL (ref 8.6–10.4)
CHLORIDE BLD-SCNC: 95 MMOL/L (ref 98–107)
CO2: 23 MMOL/L (ref 20–31)
CREAT SERPL-MCNC: 0.59 MG/DL (ref 0.5–0.9)
DIFFERENTIAL TYPE: ABNORMAL
EOSINOPHILS RELATIVE PERCENT: 0 % (ref 1–4)
GFR AFRICAN AMERICAN: >60 ML/MIN
GFR NON-AFRICAN AMERICAN: >60 ML/MIN
GFR SERPL CREATININE-BSD FRML MDRD: ABNORMAL ML/MIN/{1.73_M2}
GFR SERPL CREATININE-BSD FRML MDRD: ABNORMAL ML/MIN/{1.73_M2}
GLUCOSE BLD-MCNC: 99 MG/DL (ref 70–99)
HCT VFR BLD CALC: 37 % (ref 36–46)
HEMOGLOBIN: 12.4 G/DL (ref 12–16)
IMMATURE GRANULOCYTES: ABNORMAL %
LYMPHOCYTES # BLD: 5 % (ref 24–44)
MAGNESIUM: 1.7 MG/DL (ref 1.6–2.6)
MCH RBC QN AUTO: 31.4 PG (ref 26–34)
MCHC RBC AUTO-ENTMCNC: 33.4 G/DL (ref 31–37)
MCV RBC AUTO: 93.8 FL (ref 80–100)
MONOCYTES # BLD: 7 % (ref 2–11)
NRBC AUTOMATED: ABNORMAL PER 100 WBC
PDW BLD-RTO: 13.1 % (ref 12.5–15.4)
PLATELET # BLD: 311 K/UL (ref 140–450)
PLATELET ESTIMATE: ABNORMAL
PMV BLD AUTO: 7.4 FL (ref 6–12)
POTASSIUM SERPL-SCNC: 3.3 MMOL/L (ref 3.7–5.3)
RBC # BLD: 3.94 M/UL (ref 4–5.2)
RBC # BLD: ABNORMAL 10*6/UL
SARS-COV-2, RAPID: NOT DETECTED
SEG NEUTROPHILS: 88 % (ref 36–66)
SEGMENTED NEUTROPHILS ABSOLUTE COUNT: 15.5 K/UL (ref 1.8–7.7)
SODIUM BLD-SCNC: 131 MMOL/L (ref 135–144)
SPECIMEN DESCRIPTION: NORMAL
WBC # BLD: 17.7 K/UL (ref 3.5–11)
WBC # BLD: ABNORMAL 10*3/UL

## 2021-11-24 PROCEDURE — 96376 TX/PRO/DX INJ SAME DRUG ADON: CPT

## 2021-11-24 PROCEDURE — G0378 HOSPITAL OBSERVATION PER HR: HCPCS

## 2021-11-24 PROCEDURE — 6360000002 HC RX W HCPCS: Performed by: INTERNAL MEDICINE

## 2021-11-24 PROCEDURE — 36415 COLL VENOUS BLD VENIPUNCTURE: CPT

## 2021-11-24 PROCEDURE — 96372 THER/PROPH/DIAG INJ SC/IM: CPT

## 2021-11-24 PROCEDURE — 96361 HYDRATE IV INFUSION ADD-ON: CPT

## 2021-11-24 PROCEDURE — 96366 THER/PROPH/DIAG IV INF ADDON: CPT

## 2021-11-24 PROCEDURE — 96375 TX/PRO/DX INJ NEW DRUG ADDON: CPT

## 2021-11-24 PROCEDURE — 6360000004 HC RX CONTRAST MEDICATION: Performed by: PHYSICIAN ASSISTANT

## 2021-11-24 PROCEDURE — 2580000003 HC RX 258: Performed by: INTERNAL MEDICINE

## 2021-11-24 PROCEDURE — 85025 COMPLETE CBC W/AUTO DIFF WBC: CPT

## 2021-11-24 PROCEDURE — 96365 THER/PROPH/DIAG IV INF INIT: CPT

## 2021-11-24 PROCEDURE — 80048 BASIC METABOLIC PNL TOTAL CA: CPT

## 2021-11-24 PROCEDURE — 2580000003 HC RX 258: Performed by: PHYSICIAN ASSISTANT

## 2021-11-24 PROCEDURE — 99284 EMERGENCY DEPT VISIT MOD MDM: CPT

## 2021-11-24 PROCEDURE — 87635 SARS-COV-2 COVID-19 AMP PRB: CPT

## 2021-11-24 PROCEDURE — 70491 CT SOFT TISSUE NECK W/DYE: CPT

## 2021-11-24 PROCEDURE — 83735 ASSAY OF MAGNESIUM: CPT

## 2021-11-24 PROCEDURE — 6360000002 HC RX W HCPCS: Performed by: PHYSICIAN ASSISTANT

## 2021-11-24 PROCEDURE — 6370000000 HC RX 637 (ALT 250 FOR IP): Performed by: INTERNAL MEDICINE

## 2021-11-24 RX ORDER — 0.9 % SODIUM CHLORIDE 0.9 %
1000 INTRAVENOUS SOLUTION INTRAVENOUS ONCE
Status: COMPLETED | OUTPATIENT
Start: 2021-11-24 | End: 2021-11-24

## 2021-11-24 RX ORDER — SODIUM CHLORIDE 0.9 % (FLUSH) 0.9 %
5-40 SYRINGE (ML) INJECTION EVERY 12 HOURS SCHEDULED
Status: DISCONTINUED | OUTPATIENT
Start: 2021-11-24 | End: 2021-11-25 | Stop reason: HOSPADM

## 2021-11-24 RX ORDER — KETOROLAC TROMETHAMINE 30 MG/ML
15 INJECTION, SOLUTION INTRAMUSCULAR; INTRAVENOUS ONCE
Status: COMPLETED | OUTPATIENT
Start: 2021-11-24 | End: 2021-11-24

## 2021-11-24 RX ORDER — ACETAMINOPHEN 650 MG/1
650 SUPPOSITORY RECTAL EVERY 6 HOURS PRN
Status: DISCONTINUED | OUTPATIENT
Start: 2021-11-24 | End: 2021-11-25 | Stop reason: HOSPADM

## 2021-11-24 RX ORDER — POTASSIUM CHLORIDE 7.45 MG/ML
10 INJECTION INTRAVENOUS PRN
Status: DISCONTINUED | OUTPATIENT
Start: 2021-11-24 | End: 2021-11-25 | Stop reason: HOSPADM

## 2021-11-24 RX ORDER — SODIUM CHLORIDE 9 MG/ML
25 INJECTION, SOLUTION INTRAVENOUS PRN
Status: DISCONTINUED | OUTPATIENT
Start: 2021-11-24 | End: 2021-11-25 | Stop reason: HOSPADM

## 2021-11-24 RX ORDER — DEXTROSE AND SODIUM CHLORIDE 5; .45 G/100ML; G/100ML
INJECTION, SOLUTION INTRAVENOUS CONTINUOUS
Status: DISCONTINUED | OUTPATIENT
Start: 2021-11-24 | End: 2021-11-25 | Stop reason: HOSPADM

## 2021-11-24 RX ORDER — BUSPIRONE HYDROCHLORIDE 5 MG/1
5 TABLET ORAL 3 TIMES DAILY
Status: DISCONTINUED | OUTPATIENT
Start: 2021-11-24 | End: 2021-11-24

## 2021-11-24 RX ORDER — ACETAMINOPHEN 325 MG/1
650 TABLET ORAL EVERY 6 HOURS PRN
Status: DISCONTINUED | OUTPATIENT
Start: 2021-11-24 | End: 2021-11-25 | Stop reason: HOSPADM

## 2021-11-24 RX ORDER — ACETAMINOPHEN 325 MG/1
650 TABLET ORAL EVERY 6 HOURS PRN
Status: DISCONTINUED | OUTPATIENT
Start: 2021-11-24 | End: 2021-11-24

## 2021-11-24 RX ORDER — POLYETHYLENE GLYCOL 3350 17 G/17G
17 POWDER, FOR SOLUTION ORAL DAILY PRN
Status: DISCONTINUED | OUTPATIENT
Start: 2021-11-24 | End: 2021-11-25 | Stop reason: HOSPADM

## 2021-11-24 RX ORDER — MAGNESIUM SULFATE 1 G/100ML
1000 INJECTION INTRAVENOUS PRN
Status: DISCONTINUED | OUTPATIENT
Start: 2021-11-24 | End: 2021-11-25 | Stop reason: HOSPADM

## 2021-11-24 RX ORDER — AMOXICILLIN AND CLAVULANATE POTASSIUM 875; 125 MG/1; MG/1
1 TABLET, FILM COATED ORAL 2 TIMES DAILY
Qty: 20 TABLET | Refills: 0 | Status: ON HOLD | OUTPATIENT
Start: 2021-11-24 | End: 2021-11-24

## 2021-11-24 RX ORDER — DIPHENHYDRAMINE HCL 25 MG
25 TABLET ORAL 2 TIMES DAILY PRN
Status: DISCONTINUED | OUTPATIENT
Start: 2021-11-24 | End: 2021-11-25 | Stop reason: HOSPADM

## 2021-11-24 RX ORDER — ONDANSETRON 2 MG/ML
4 INJECTION INTRAMUSCULAR; INTRAVENOUS EVERY 6 HOURS PRN
Status: DISCONTINUED | OUTPATIENT
Start: 2021-11-24 | End: 2021-11-25 | Stop reason: HOSPADM

## 2021-11-24 RX ORDER — KETOROLAC TROMETHAMINE 30 MG/ML
30 INJECTION, SOLUTION INTRAMUSCULAR; INTRAVENOUS ONCE
Status: DISCONTINUED | OUTPATIENT
Start: 2021-11-24 | End: 2021-11-24

## 2021-11-24 RX ORDER — ONDANSETRON 4 MG/1
4 TABLET, ORALLY DISINTEGRATING ORAL EVERY 8 HOURS PRN
Status: DISCONTINUED | OUTPATIENT
Start: 2021-11-24 | End: 2021-11-25 | Stop reason: HOSPADM

## 2021-11-24 RX ORDER — 0.9 % SODIUM CHLORIDE 0.9 %
80 INTRAVENOUS SOLUTION INTRAVENOUS ONCE
Status: COMPLETED | OUTPATIENT
Start: 2021-11-24 | End: 2021-11-24

## 2021-11-24 RX ORDER — BUSPIRONE HYDROCHLORIDE 5 MG/1
5 TABLET ORAL 3 TIMES DAILY PRN
Status: DISCONTINUED | OUTPATIENT
Start: 2021-11-24 | End: 2021-11-25 | Stop reason: HOSPADM

## 2021-11-24 RX ORDER — POTASSIUM CHLORIDE 20 MEQ/1
40 TABLET, EXTENDED RELEASE ORAL PRN
Status: DISCONTINUED | OUTPATIENT
Start: 2021-11-24 | End: 2021-11-25 | Stop reason: HOSPADM

## 2021-11-24 RX ORDER — SODIUM CHLORIDE 0.9 % (FLUSH) 0.9 %
10 SYRINGE (ML) INJECTION PRN
Status: DISCONTINUED | OUTPATIENT
Start: 2021-11-24 | End: 2021-11-25 | Stop reason: HOSPADM

## 2021-11-24 RX ORDER — DEXAMETHASONE SODIUM PHOSPHATE 4 MG/ML
4 INJECTION, SOLUTION INTRA-ARTICULAR; INTRALESIONAL; INTRAMUSCULAR; INTRAVENOUS; SOFT TISSUE EVERY 6 HOURS
Status: DISCONTINUED | OUTPATIENT
Start: 2021-11-24 | End: 2021-11-25 | Stop reason: HOSPADM

## 2021-11-24 RX ORDER — ACETAMINOPHEN 650 MG/1
650 SUPPOSITORY RECTAL EVERY 6 HOURS PRN
Status: DISCONTINUED | OUTPATIENT
Start: 2021-11-24 | End: 2021-11-24

## 2021-11-24 RX ORDER — DEXAMETHASONE SODIUM PHOSPHATE 4 MG/ML
4 INJECTION, SOLUTION INTRA-ARTICULAR; INTRALESIONAL; INTRAMUSCULAR; INTRAVENOUS; SOFT TISSUE ONCE
Status: COMPLETED | OUTPATIENT
Start: 2021-11-24 | End: 2021-11-24

## 2021-11-24 RX ADMIN — DEXAMETHASONE SODIUM PHOSPHATE 4 MG: 4 INJECTION, SOLUTION INTRAMUSCULAR; INTRAVENOUS at 20:30

## 2021-11-24 RX ADMIN — SODIUM CHLORIDE 1000 ML: 9 INJECTION, SOLUTION INTRAVENOUS at 14:43

## 2021-11-24 RX ADMIN — PHENOL 1 SPRAY: 1.5 LIQUID ORAL at 17:45

## 2021-11-24 RX ADMIN — ACETAMINOPHEN 650 MG: 325 TABLET ORAL at 17:40

## 2021-11-24 RX ADMIN — SODIUM CHLORIDE 3000 MG: 9 INJECTION, SOLUTION INTRAVENOUS at 20:30

## 2021-11-24 RX ADMIN — IOPAMIDOL 75 ML: 755 INJECTION, SOLUTION INTRAVENOUS at 15:04

## 2021-11-24 RX ADMIN — DEXTROSE AND SODIUM CHLORIDE: 5; 450 INJECTION, SOLUTION INTRAVENOUS at 17:41

## 2021-11-24 RX ADMIN — KETOROLAC TROMETHAMINE 15 MG: 30 INJECTION, SOLUTION INTRAMUSCULAR at 14:44

## 2021-11-24 RX ADMIN — POTASSIUM CHLORIDE 40 MEQ: 1500 TABLET, EXTENDED RELEASE ORAL at 20:47

## 2021-11-24 RX ADMIN — SODIUM CHLORIDE 1000 ML: 9 INJECTION, SOLUTION INTRAVENOUS at 16:00

## 2021-11-24 RX ADMIN — SODIUM CHLORIDE, PRESERVATIVE FREE 10 ML: 5 INJECTION INTRAVENOUS at 15:04

## 2021-11-24 RX ADMIN — SODIUM CHLORIDE 80 ML: 9 INJECTION, SOLUTION INTRAVENOUS at 15:04

## 2021-11-24 RX ADMIN — SODIUM CHLORIDE 1500 MG: 900 INJECTION INTRAVENOUS at 15:14

## 2021-11-24 RX ADMIN — ENOXAPARIN SODIUM 40 MG: 100 INJECTION SUBCUTANEOUS at 17:40

## 2021-11-24 RX ADMIN — DEXAMETHASONE SODIUM PHOSPHATE 4 MG: 4 INJECTION, SOLUTION INTRAMUSCULAR; INTRAVENOUS at 14:44

## 2021-11-24 ASSESSMENT — PAIN DESCRIPTION - DESCRIPTORS
DESCRIPTORS: ACHING;SHARP;SORE;TENDER
DESCRIPTORS: ACHING;SORE
DESCRIPTORS: SHARP;STABBING
DESCRIPTORS: ACHING;TENDER;SORE

## 2021-11-24 ASSESSMENT — ENCOUNTER SYMPTOMS
BACK PAIN: 0
SHORTNESS OF BREATH: 0
VOICE CHANGE: 1
COUGH: 0
EYE REDNESS: 0
VOMITING: 0
EYE PAIN: 0
NAUSEA: 0
EYE DISCHARGE: 0
SORE THROAT: 1
TROUBLE SWALLOWING: 1
ABDOMINAL PAIN: 0

## 2021-11-24 ASSESSMENT — PAIN DESCRIPTION - PAIN TYPE
TYPE: ACUTE PAIN

## 2021-11-24 ASSESSMENT — PAIN SCALES - GENERAL
PAINLEVEL_OUTOF10: 8
PAINLEVEL_OUTOF10: 6

## 2021-11-24 ASSESSMENT — PAIN DESCRIPTION - FREQUENCY
FREQUENCY: CONTINUOUS

## 2021-11-24 ASSESSMENT — PAIN DESCRIPTION - PROGRESSION
CLINICAL_PROGRESSION: GRADUALLY IMPROVING

## 2021-11-24 ASSESSMENT — PAIN DESCRIPTION - ONSET
ONSET: ON-GOING

## 2021-11-24 ASSESSMENT — PAIN DESCRIPTION - ORIENTATION: ORIENTATION: RIGHT;LEFT

## 2021-11-24 ASSESSMENT — PAIN DESCRIPTION - LOCATION
LOCATION: THROAT

## 2021-11-24 ASSESSMENT — PAIN - FUNCTIONAL ASSESSMENT: PAIN_FUNCTIONAL_ASSESSMENT: ACTIVITIES ARE NOT PREVENTED

## 2021-11-24 NOTE — ED PROVIDER NOTES
[id number] [facility]  [address] [place] [address]  Phone: [phone]  Fax: [phone]      Pt Name: Anupam Bragg  MRN: [id number]  [place] 1980  Date of evaluation: 11/24/2021      CHIEF COMPLAINT       Chief Complaint   Patient presents with    Pharyngitis    Fever       HISTORY OF PRESENT ILLNESS   (Location, Quality, Severity, Duration, Timing, Context, ModifyingFactors, Associated Signs and Symptoms)     Anupam Bragg is a 35 y.o. female who presents to the ER for evaluation of sore throat. Patient states that her throat started to feel scratchy on Sunday. When she woke on Monday she had significant discomfort in the throat. Throat pain has progressively worsened. She was evaluated by her primary care physician yesterday. She was screened for strep pharyngitis, mononucleosis, influenza and Covid. All of her testing was found to be negative. She states that the pain is so severe today that she is unable to eat and drink. She has had fever since Monday, Tmax = 103.7 °F.  Patient was given a Z-Fadi yesterday. She states that she has taken 2 doses of the medication. She feels achy all over and has had a headache. She denies exposure to ill contacts. She rates her sore throat discomfort at 9/10. Patient states that she is known to have antibodies to mono. She is not vaccinated to influenza or COVID-19. Nursing Notes were reviewed. REVIEW OF SYSTEMS     (2-9 systems for level 4, 10 or more for level 5)    Review of Systems   Constitutional: Positive for fever. HENT: Positive for sore throat, trouble swallowing and voice change. Negative for congestion. Eyes: Negative for pain, discharge and redness. Respiratory: Negative for cough and shortness of breath. Cardiovascular: Negative for chest pain. Gastrointestinal: Negative for abdominal pain, nausea and vomiting. Genitourinary: Negative for dysuria and frequency.    Musculoskeletal: Positive for myalgias. Negative for back pain and gait problem. Skin: Negative for rash. Neurological: Positive for headaches. Negative for seizures and syncope. All other systems reviewed and are negative. PAST MEDICAL HISTORY    has a past medical history of ADHD (attention deficit hyperactivity disorder), Allergic rhinitis, Anxiety, Depression, High risk HPV infection, LGSIL Pap smear of vagina, Pituitary disorder ([address] PTSD (post-traumatic stress disorder), and Vaginal high risk human papillomavirus (HPV) DNA test positive. SURGICAL HISTORY      has a past surgical history that includes Throat surgery (1980); Colposcopy (6/26/14); Colposcopy (2/12/2015); other surgical history; and Hysterectomy, vaginal.    CURRENT MEDICATIONS       Previous Medications    ADAPALENE (DIFFERIN) 0.1 % CREAM    Apply topically nightly. AMOXICILLIN-CLAVULANATE (AUGMENTIN) 875-125 MG PER TABLET    Take 1 tablet by mouth 2 times daily for 10 days    ASPIRIN-ACETAMINOPHEN-CAFFEINE (EXCEDRIN MIGRAINE) 250-250-65 MG PER TABLET    Take 2 tablets by mouth as needed for Headaches (severe headaches) Take 2 tablets once every 24 hours (maximum: 2 tablets/day). Note: Limit use to no more than 2 days per week    BUSPIRONE (BUSPAR) 5 MG TABLET    Take 1 tablet by mouth 3 times daily    DIPHENHYDRAMINE (BENADRYL ALLERGY) 25 MG CAPSULE    Take 1 capsule by mouth 2 times daily as needed (Severe headache with nausea and vomiting.)    MULTIPLE VITAMINS-MINERALS (MULTI-B-PLUS PO)    Take by mouth    PROMETHAZINE (PHENERGAN) 12.5 MG TABLET    Take 1 tablet by mouth every 8 hours as needed for Nausea (moderate to severe headache)    UBROGEPANT (UBRELVY) 100 MG TABS    Take one tablet at onset of HA and may take another 2 hours later     ALLERGIES     has No Known Allergies. FAMILY HISTORY     She indicated that her mother is alive. She indicated that her father is alive. She indicated that the status of her sister is unknown.  She indicated that her maternal grandmother is . She indicated that her maternal grandfather is . She indicated that her paternal grandmother is . She indicated that her paternal grandfather is . family history includes Depression in her sister; Diabetes in her maternal grandmother and paternal grandmother; Heart Attack in her father; Heart Disease in her maternal grandmother; Coralyn Melissa in her paternal grandfather; Lupus in her mother; Other in her father, mother, paternal grandmother, and sister. SOCIAL HISTORY      reports that she has never smoked. She has never used smokeless tobacco. She reports current alcohol use. She reports that she does not use drugs. PHYSICAL EXAM     (7 for level 4, 8 or more for level 5)    ED Triage Vitals [21 1350]   BP Temp Temp Source Pulse Resp SpO2 Height Weight   (!) 154/93 100.2 °F (37.9 °C) Oral 123 18 100 % 5' 6\" (1.676 m) 150 lb (68 kg)     Physical Exam  Vitals reviewed. Constitutional:       Appearance: She is normal weight. She is ill-appearing. She is not toxic-appearing. HENT:      Head: Normocephalic and atraumatic. Right Ear: Tympanic membrane and ear canal normal.      Left Ear: Tympanic membrane and ear canal normal.      Mouth/Throat:      Comments: Bilateral tonsils with white exudate and swelling; uvula is midline. Mild trismus. Eyes:      General: No scleral icterus. Cardiovascular:      Rate and Rhythm: Regular rhythm. Tachycardia present. Heart sounds: Normal heart sounds. Pulmonary:      Effort: Pulmonary effort is normal. No respiratory distress. Breath sounds: Normal breath sounds. Musculoskeletal:      Cervical back: Normal range of motion and neck supple. Lymphadenopathy:      Cervical: Cervical adenopathy present. Skin:     General: Skin is warm and dry. Findings: No rash. Neurological:      General: No focal deficit present. Mental Status: She is alert.    Psychiatric: Mood and Affect: Mood normal.         Behavior: Behavior normal.       DIAGNOSTIC RESULTS     RADIOLOGY:   Radiology images were visualized by myself. The Radiologist interpretations were reviewed and are as follows:       CT SOFT TISSUE NECK W CONTRAST (Final result)  Result time 11/24/21 15:34:58  Final result by Parish Latham MD (11/24/21 15:34:58)                Impression:    1. Bilateral palatine tonsillitis.  No associated drainable collections. Visualized airway appears patent. 2. Cervical lymphadenopathy, likely reactive in nature. Narrative:    EXAMINATION:   CT OF THE NECK SOFT TISSUE WITH CONTRAST  11/24/2021     TECHNIQUE:   CT of the neck was performed with the administration of intravenous contrast.   Multiplanar reformatted images are provided for review. Dose modulation,   iterative reconstruction, and/or weight based adjustment of the mA/kV was   utilized to reduce the radiation dose to as low as reasonably achievable. COMPARISON:   10/29/2019     HISTORY:   ORDERING SYSTEM PROVIDED HISTORY: severe sore throat; difficulty swallowing   TECHNOLOGIST PROVIDED HISTORY:   severe sore throat; difficulty swallowing   Decision Support Exception - unselect if not a suspected or confirmed   emergency medical condition->Emergency Medical Condition (MA)   Reason for Exam: sore throat, fever   Acuity: Acute   Type of Exam: Initial   Additional signs and symptoms: difficulty swallowing. Relevant Medical/Surgical History: hx of throat cyst removal     FINDINGS:   PHARYNX/LARYNX:  The palatine tonsils are enlarged with a striated   enhancement pattern.  The adenoid and lingual tonsils are also mildly   enlarged.  No convincing drainable collections appreciated.  Visualized   airway appears patent.  The tongue is normal in appearance.  The valleculae,   epiglottis, aryepiglottic folds and pyriform sinuses appear unremarkable. The true and false vocal cords are normal in appearance. Range    Glucose 99 70 - 99 mg/dL    BUN 8 6 - 20 mg/dL    CREATININE 0.59 0.50 - 0.90 mg/dL    Bun/Cre Ratio NOT REPORTED 9 - 20    Calcium 8.9 8.6 - 10.4 mg/dL    Sodium 131 (L) 135 - 144 mmol/L    Potassium 3.3 (L) 3.7 - 5.3 mmol/L    Chloride 95 (L) 98 - 107 mmol/L    CO2 23 20 - 31 mmol/L    Anion Gap 13 9 - 17 mmol/L    GFR Non-African American >60 >60 mL/min    GFR African American >60 >60 mL/min    GFR Comment          GFR Staging NOT REPORTED    Magnesium   Result Value Ref Range    Magnesium 1.7 1.6 - 2.6 mg/dL     MDM:   Patient presents to the ER for evaluation of    EMERGENCY DEPARTMENT COURSE:   Vitals:    Vitals:    11/24/21 1350   BP: (!) 154/93   Pulse: 123   Resp: 18   Temp: 100.2 °F (37.9 °C)   TempSrc: Oral   SpO2: 100%   Weight: 68 kg (150 lb)   Height: 5' 6\" (1.676 m)     -------------------------  BP: (!) 154/93, Temp: 100.2 °F (37.9 °C), Pulse: 123, Resp: 18    The patient was given the following medications:  Orders Placed This Encounter   Medications    0.9 % sodium chloride bolus    DISCONTD: ketorolac (TORADOL) injection 30 mg    dexamethasone (DECADRON) injection 4 mg    ketorolac (TORADOL) injection 15 mg    iopamidol (ISOVUE-370) 76 % injection 75 mL    0.9 % sodium chloride bolus    sodium chloride flush 0.9 % injection 10 mL    ampicillin-sulbactam (UNASYN) 1500 mg IVPB minibag     Order Specific Question:   Antimicrobial Indications     Answer:   Head and Neck Infection    0.9 % sodium chloride bolus      Re-evaluation Notes  3:54 PM.  Thoughts of the labs and CT scan were discussed with the patient by myself. Patient has an elevated white blood cell count of 51,369 with neutrophilic shift. Patient has a slightly low sodium, potassium and chloride level. Clinically she appears dehydrated. CT scan of the soft tissue neck shows bilateral palatine tonsillitis, but no evidence of peritonsillar abscess. Patient continues to have significant throat discomfort.   I do not feel

## 2021-11-24 NOTE — ED PROVIDER NOTES
[id number]    [address] [place] [place] [address]  Phone: [phone]  Fax: [phone]  Emergency Department  Faculty Attestation    I performed a history and physical examination of the patient and discussed management with the mid level provideer. I reviewed the mid level provider's note and agree with the documented findings and plan of care. Any areas of disagreement are noted on the chart. I was personally present for the key portions of any procedures. I have documented in the chart those procedures where I was not present during the key portions. I have reviewed the emergency nurses triage note. I agree with the chief complaint, past medical history, past surgical history, allergies, medications, social and family history as documented unless otherwise noted below. Documentation of the HPI, Physical Exam and Medical Decision Making performed by medical students or scribes is based on my personal performance of the HPI, PE and MDM. For Physician Assistant/ Nurse Practitioner cases/documentation I have personally evaluated this patient and have completed at least one if not all key elements of the E/M (history, physical exam, and MDM). Additional findings are as noted. Primary Care Physician:  Crystal Schaefer PA-C    CHIEF COMPLAINT       Chief Complaint   Patient presents with    Pharyngitis    Fever       RECENT VITALS:   Temp: 100.2 °F (37.9 °C),  Pulse: 123, Resp: 18, BP: (!) 154/93    LABS:  Labs Reviewed   CBC WITH AUTO DIFFERENTIAL   BASIC METABOLIC PANEL W/ REFLEX TO MG FOR LOW K          CT SOFT TISSUE NECK W CONTRAST (Final result)  Result time 11/24/21 15:34:58  Final result by Kane Joe MD (11/24/21 15:34:58)                Impression:    1. Bilateral palatine tonsillitis.  No associated drainable collections. Visualized airway appears patent. 2. Cervical lymphadenopathy, likely reactive in nature.              Narrative:    EXAMINATION:   CT OF THE NECK SOFT TISSUE WITH CONTRAST  11/24/2021     TECHNIQUE:   CT of the neck was performed with the administration of intravenous contrast.   Multiplanar reformatted images are provided for review. Dose modulation,   iterative reconstruction, and/or weight based adjustment of the mA/kV was   utilized to reduce the radiation dose to as low as reasonably achievable. COMPARISON:   10/29/2019     HISTORY:   ORDERING SYSTEM PROVIDED HISTORY: severe sore throat; difficulty swallowing   TECHNOLOGIST PROVIDED HISTORY:   severe sore throat; difficulty swallowing   Decision Support Exception - unselect if not a suspected or confirmed   emergency medical condition->Emergency Medical Condition (MA)   Reason for Exam: sore throat, fever   Acuity: Acute   Type of Exam: Initial   Additional signs and symptoms: difficulty swallowing. Relevant Medical/Surgical History: hx of throat cyst removal     FINDINGS:   PHARYNX/LARYNX:  The palatine tonsils are enlarged with a striated   enhancement pattern.  The adenoid and lingual tonsils are also mildly   enlarged.  No convincing drainable collections appreciated.  Visualized   airway appears patent.  The tongue is normal in appearance.  The valleculae,   epiglottis, aryepiglottic folds and pyriform sinuses appear unremarkable. The true and false vocal cords are normal in appearance. SALIVARY GLANDS/THYROID:  The parotid and submandibular glands appear   unremarkable.  The thyroid gland appears unremarkable. LYMPH NODES:  Enlarged level 2A lymph nodes measuring up to 2.2 cm on the   left and 1.8 cm on the right. SOFT TISSUES:  No appreciable soft tissue swelling or mass is seen. BRAIN/ORBITS/SINUSES:  The visualized portion of the intracranial contents   appear unremarkable.  The visualized portion of the orbits, paranasal sinuses   and mastoid air cells demonstrate no acute abnormality.      LUNG APICES/SUPERIOR MEDIASTINUM:  No focal consolidation is seen within the visualized lung apices.  No superior mediastinal lymphadenopathy or mass. The visualized portion of the trachea appears unremarkable. BONES:  No aggressive appearing lytic or blastic bony lesion.                       PERTINENT ATTENDING PHYSICIAN COMMENTS:    The patient presents with a sore throat. She was evaluated recently and had a negative strep and mono as well as Covid test.  She continues to have discomfort and exudate on her throat. She has now noticed some change in how she speaks but she is managing her secretions. On exam, the patient has slightly muffled speech but no hot potato speech. She is not drooling. She is managing her secretions well. She does appear comfortable. She is laying back and not sitting up in a tripod position. She does have exudate and swelling of the tonsils bilaterally but the uvula is midline without obvious peritonsillar abscess. The CT demonstrates no abscess. We will continue her on IV antibiotics. We have discussed the case with the hospitalist.  The patient will be admitted in [facility]. She is admitted in stable condition.          Shirin Ayoub MD  11/29/21 9412

## 2021-11-24 NOTE — ED NOTES
Americo sent to [facility], Dr Adam Godfrey for admission     Anup Lal RN  11/24/21 7984

## 2021-11-25 VITALS
TEMPERATURE: 98.5 F | RESPIRATION RATE: 16 BRPM | OXYGEN SATURATION: 95 % | SYSTOLIC BLOOD PRESSURE: 131 MMHG | HEART RATE: 58 BPM | DIASTOLIC BLOOD PRESSURE: 76 MMHG | BODY MASS INDEX: 24.13 KG/M2 | HEIGHT: 66 IN | WEIGHT: 150.13 LBS

## 2021-11-25 PROBLEM — D35.2 PROLACTINOMA (HCC): Chronic | Status: ACTIVE | Noted: 2020-12-08

## 2021-11-25 PROBLEM — E87.6 HYPOKALEMIA: Status: ACTIVE | Noted: 2021-11-25

## 2021-11-25 PROBLEM — Z78.9 FAILURE OF OUTPATIENT TREATMENT: Status: ACTIVE | Noted: 2021-11-25

## 2021-11-25 LAB
ANION GAP SERPL CALCULATED.3IONS-SCNC: 10 MMOL/L (ref 9–17)
BUN BLDV-MCNC: 7 MG/DL (ref 6–20)
BUN/CREAT BLD: ABNORMAL (ref 9–20)
CALCIUM SERPL-MCNC: 8.6 MG/DL (ref 8.6–10.4)
CHLORIDE BLD-SCNC: 107 MMOL/L (ref 98–107)
CO2: 22 MMOL/L (ref 20–31)
CREAT SERPL-MCNC: 0.42 MG/DL (ref 0.5–0.9)
GFR AFRICAN AMERICAN: >60 ML/MIN
GFR NON-AFRICAN AMERICAN: >60 ML/MIN
GFR SERPL CREATININE-BSD FRML MDRD: ABNORMAL ML/MIN/{1.73_M2}
GFR SERPL CREATININE-BSD FRML MDRD: ABNORMAL ML/MIN/{1.73_M2}
GLUCOSE BLD-MCNC: 163 MG/DL (ref 70–99)
HCT VFR BLD CALC: 35.1 % (ref 36–46)
HEMOGLOBIN: 11.8 G/DL (ref 12–16)
MCH RBC QN AUTO: 31.6 PG (ref 26–34)
MCHC RBC AUTO-ENTMCNC: 33.5 G/DL (ref 31–37)
MCV RBC AUTO: 94.2 FL (ref 80–100)
NRBC AUTOMATED: ABNORMAL PER 100 WBC
PDW BLD-RTO: 13 % (ref 12.5–15.4)
PLATELET # BLD: 312 K/UL (ref 140–450)
PMV BLD AUTO: 8.2 FL (ref 6–12)
POTASSIUM SERPL-SCNC: 3.7 MMOL/L (ref 3.7–5.3)
RBC # BLD: 3.73 M/UL (ref 4–5.2)
SODIUM BLD-SCNC: 139 MMOL/L (ref 135–144)
WBC # BLD: 13.2 K/UL (ref 3.5–11)

## 2021-11-25 PROCEDURE — 36415 COLL VENOUS BLD VENIPUNCTURE: CPT

## 2021-11-25 PROCEDURE — 96366 THER/PROPH/DIAG IV INF ADDON: CPT

## 2021-11-25 PROCEDURE — 6360000002 HC RX W HCPCS: Performed by: INTERNAL MEDICINE

## 2021-11-25 PROCEDURE — 99219 PR INITIAL OBSERVATION CARE/DAY 50 MINUTES: CPT | Performed by: INTERNAL MEDICINE

## 2021-11-25 PROCEDURE — G0378 HOSPITAL OBSERVATION PER HR: HCPCS

## 2021-11-25 PROCEDURE — 96361 HYDRATE IV INFUSION ADD-ON: CPT

## 2021-11-25 PROCEDURE — 80048 BASIC METABOLIC PNL TOTAL CA: CPT

## 2021-11-25 PROCEDURE — 85027 COMPLETE CBC AUTOMATED: CPT

## 2021-11-25 PROCEDURE — 2580000003 HC RX 258: Performed by: INTERNAL MEDICINE

## 2021-11-25 PROCEDURE — 6370000000 HC RX 637 (ALT 250 FOR IP): Performed by: INTERNAL MEDICINE

## 2021-11-25 PROCEDURE — 96376 TX/PRO/DX INJ SAME DRUG ADON: CPT

## 2021-11-25 PROCEDURE — 96372 THER/PROPH/DIAG INJ SC/IM: CPT

## 2021-11-25 RX ORDER — AMOXICILLIN AND CLAVULANATE POTASSIUM 875; 125 MG/1; MG/1
1 TABLET, FILM COATED ORAL 2 TIMES DAILY
Qty: 20 TABLET | Refills: 0 | Status: SHIPPED | OUTPATIENT
Start: 2021-11-25 | End: 2021-12-05

## 2021-11-25 RX ADMIN — SODIUM CHLORIDE, PRESERVATIVE FREE 10 ML: 5 INJECTION INTRAVENOUS at 09:03

## 2021-11-25 RX ADMIN — PHENOL 1 SPRAY: 1.5 LIQUID ORAL at 04:33

## 2021-11-25 RX ADMIN — DEXAMETHASONE SODIUM PHOSPHATE 4 MG: 4 INJECTION, SOLUTION INTRAMUSCULAR; INTRAVENOUS at 09:03

## 2021-11-25 RX ADMIN — ENOXAPARIN SODIUM 40 MG: 100 INJECTION SUBCUTANEOUS at 09:03

## 2021-11-25 RX ADMIN — ACETAMINOPHEN 650 MG: 325 TABLET ORAL at 04:33

## 2021-11-25 RX ADMIN — DEXAMETHASONE SODIUM PHOSPHATE 4 MG: 4 INJECTION, SOLUTION INTRAMUSCULAR; INTRAVENOUS at 02:17

## 2021-11-25 RX ADMIN — DEXTROSE AND SODIUM CHLORIDE: 5; 450 INJECTION, SOLUTION INTRAVENOUS at 06:27

## 2021-11-25 RX ADMIN — SODIUM CHLORIDE 3000 MG: 9 INJECTION, SOLUTION INTRAVENOUS at 02:17

## 2021-11-25 RX ADMIN — SODIUM CHLORIDE 3000 MG: 9 INJECTION, SOLUTION INTRAVENOUS at 07:51

## 2021-11-25 ASSESSMENT — PAIN DESCRIPTION - PAIN TYPE: TYPE: ACUTE PAIN

## 2021-11-25 ASSESSMENT — PAIN SCALES - GENERAL
PAINLEVEL_OUTOF10: 6
PAINLEVEL_OUTOF10: 5
PAINLEVEL_OUTOF10: 0

## 2021-11-25 ASSESSMENT — PAIN DESCRIPTION - LOCATION: LOCATION: THROAT

## 2021-11-25 ASSESSMENT — PAIN DESCRIPTION - PROGRESSION: CLINICAL_PROGRESSION: GRADUALLY IMPROVING

## 2021-11-25 ASSESSMENT — PAIN DESCRIPTION - FREQUENCY: FREQUENCY: CONTINUOUS

## 2021-11-25 ASSESSMENT — PAIN DESCRIPTION - DESCRIPTORS: DESCRIPTORS: ACHING;SORE

## 2021-11-25 ASSESSMENT — PAIN DESCRIPTION - ONSET: ONSET: ON-GOING

## 2021-11-25 NOTE — PLAN OF CARE
Problem: Pain:  Goal: Pain level will decrease  Description: Pain level will decrease  11/25/2021 0237 by Sidra Garcia RN  Outcome: Ongoing     Problem: Infection:  Goal: Will remain free from infection  Description: Will remain free from infection  11/25/2021 0237 by Sidra Garcia RN  Outcome: Ongoing     Problem: Daily Care:  Goal: Daily care needs are met  Description: Daily care needs are met  11/25/2021 0237 by Sidra Garcia RN  Outcome: Ongoing

## 2021-11-25 NOTE — DISCHARGE SUMMARY
[facility]  Office: [address], DO, Jenny Press, DO, Silvia Cushing, DO, Farhan Tannerpaul Blood, DO, Roel Johnson MD, Sofia Livingston MD, Clay Soliz MD, Shae Laws MD, Steve Boles MD, Cait Olvera MD, Scarlet Melgar MD, Adriel Maradiaga, DO, Autumn Robledo, DO, Teto Limon MD,  Gena Siemens, DO, Glynn Hill MD, Maxi Peterson MD, Silvia Fair MD, Judy Worthy MD, Eda Mckinley MD, Kat Santacruz MD, Missael Wilson MD, Jovana Jamil, [facility], [facility], CNP, Aaron Gross, CNP, Brooks Schmidt, CNS, Jen Stratton, [facility], Patrick Smyth, CNP, Dilia Eli, CNP, Gerber Herron, CNP, Najma Root, CNP, Roylene Goodell, PA-C, Tc Chaidez, [facility], Javier Najera, CNP, Ro Bhatti, CNP, Ligia Johnson, CNP, Gricel Rodriguez, CNP, Abdifatah Rudolph, CNP, Shukri Burden, CNP, Caitlin [facility] [address]    Discharge Summary     Patient ID: Karrie Jimenez  :  1980   MRN: [id number]     ACCOUNT:  [de-identified]   Patient's PCP: Dakota Peter PA-C  Admit Date: 2021   Discharge Date: 2021     Length of Stay: 0  Code Status:  Full Code  Admitting Physician: Christiano Maier DO  Discharge Physician: Christiano Maier DO     Active Discharge Diagnoses:     Hospital Problem Lists:  Principal Problem: Tonsillopharyngitis  Active Problems:    Prolactinoma ([facility] [address]    Failure of outpatient treatment    Hypokalemia  Resolved Problems:    * No resolved hospital problems. *      Admission Condition:  fair     Discharged Condition: stable    Hospital Stay:     Hospital Course:  Karrie Jimenez is a 35 y.o. female who was admitted for the management of  Tonsillopharyngitis , presented to ER with Pharyngitis and Fever    This is a 69-year-old female that presents with worsening sore throat and difficulty breathing and swallowing despite treatment with Zithromax.   She was diagnosed with pharyngitis approximately 3 days prior and been taking Zithromax for 3 days with worsening of symptoms. She presented to the emergency room was found to have severe tonsillitis/pharyngitis and was started on Unasyn and Decadron observe for further evaluation and management. She was treated with IV Unasyn and Decadron overnight with marked improvement in her symptoms. On the morning the 25th her diet was advanced and tolerated. She had no further difficulty swallowing, breathing or evidence of wheezing or stridor. The Decadron was discontinued and she was transitioned to oral Augmentin. She was discharged with a 10-day course of Augmentin and advised to follow with her PCP accordingly. Significant therapeutic interventions: As above    Significant Diagnostic Studies:   Labs / Micro:  CBC:   Lab Results   Component Value Date    WBC 13.2 11/25/2021    RBC 3.73 11/25/2021    RBC 3.99 03/10/2018    HGB 11.8 11/25/2021    HCT 35.1 11/25/2021    MCV 94.2 11/25/2021    MCH 31.6 11/25/2021    MCHC 33.5 11/25/2021    RDW 13.0 11/25/2021     11/25/2021     BMP:    Lab Results   Component Value Date    GLUCOSE 163 11/25/2021    GLUCOSE 85 03/10/2018     11/25/2021    K 3.7 11/25/2021     11/25/2021    CO2 22 11/25/2021    ANIONGAP 10 11/25/2021    BUN 7 11/25/2021    CREATININE 0.42 11/25/2021    BUNCRER NOT REPORTED 11/25/2021    CALCIUM 8.6 11/25/2021    LABGLOM >60 11/25/2021    GFRAA >60 11/25/2021    GFR      11/25/2021    GFR NOT REPORTED 11/25/2021        Radiology:  CT SOFT TISSUE NECK W CONTRAST    Result Date: 11/24/2021  1. Bilateral palatine tonsillitis. No associated drainable collections. Visualized airway appears patent. 2. Cervical lymphadenopathy, likely reactive in nature.        Consultations:    Consults:     Final Specialist Recommendations/Findings:   None      The patient was seen and examined on day of discharge and this discharge summary is in conjunction with any daily progress note from day of discharge. Discharge plan:     Disposition: Home    Physician Follow Up:   PCP 1 week  No follow-up provider specified. Requiring Further Evaluation/Follow Up POST HOSPITALIZATION/Incidental Findings: None    Diet: regular diet    Activity: As tolerated    Instructions to Patient: Complete antibiotics as ordered. Return the emergency room for worsening symptoms. Take a regular diet, avoid hard/crunchy foods until swelling and symptoms improved. Discharge Medications:      Medication List      START taking these medications    amoxicillin-clavulanate 875-125 MG per tablet  Commonly known as: AUGMENTIN  Take 1 tablet by mouth 2 times daily for 10 days     phenol 1.4 % Liqd mouth spray  Take 1 spray by mouth every 2 hours as needed for Sore Throat        CHANGE how you take these medications    busPIRone 5 MG tablet  Commonly known as: BUSPAR  Take 1 tablet by mouth 3 times daily  What changed:   · when to take this  · reasons to take this  · additional instructions           Where to Get Your Medications      These medications were sent to [address] [place] [address], [address] [address]    Phone: [phone]   · amoxicillin-clavulanate 875-125 MG per tablet     You can get these medications from any pharmacy    You don't need a prescription for these medications  · phenol 1.4 % Liqd mouth spray         No discharge procedures on file. Time Spent on discharge is  26 minutes in patient examination, evaluation, counseling as well as medication reconciliation, prescriptions for required medications, discharge plan and follow up. Electronically signed by   Cheng Brandon DO  11/25/2021  10:25 AM      Thank you Dr. Sara Elizalde PA-C for the opportunity to be involved in this patient's care.

## 2021-11-25 NOTE — H&P
[facility]  Office: [address], DO, Anton Colemanet, DO, Yovana Packer, DO, Frankie Porter Muñoz, DO, David Sneed MD, Viktoria Davis MD, Solis Barry MD, Freddie Lane MD, Lamonte Blanchard MD, Nella Long MD, Oscar Weston MD, Mickie Reddy, DO, Mauricio Weston, DO, Emma Dia MD,  Claudetta Shilling, DO, Cris Mendez MD, Dallas Barry MD, Larissa Castelan MD, Haley Villalpando MD, Luigi Bethea MD, Otoniel Fierro MD, Hebert Stockton MD, Brayan Fair, [facility], [facility], CNP, Martha Montilla, CNP, Km Jarrell, CNS, Heaven Parks, CNP, Elizabeth Clubs, CNP, Carlie [place], CNP, Lani Aaron, CNP, Sarah Alexander, CNP, Lashon Lincoln PA-C, Aris Barrios, [facility], Haresh Craft, CNP, Yissel Ireland, CNP, Camille Mcallister, CNP, Margie Marquez, CNP, Judie Carrion, CNP, Salvatore Freeman, CNP, Deborah Shankar, [facility]   [address] [facility]    HISTORY AND PHYSICAL EXAMINATION            Date:   11/25/2021  Patient name:  Brooks Richards  Date of admission:  11/24/2021  1:48 PM  MRN:   [id number]  Account:  [de-identified]  YOB: 1980  PCP:    Brett Wray PA-C  Room:   024/418-  Code Status:    Full Code    Chief Complaint:     Chief Complaint   Patient presents with    Pharyngitis    Fever       History Obtained From:     patient    History of Present Illness:     Brooks Richards is a 35 y.o. Non- / non  female who presents with Pharyngitis and Fever   and is admitted to the hospital for the management of Tonsillopharyngitis. This is a 40-year-old female who presents with fever and sore throat. She was diagnosed with pharyngitis approximately 3 days ago and was started on Zithromax and had taken 3 days of antibiotics without improvement and continued worsening. She presented the emergency room due to increasing pain, swelling and shortness of breath and difficulty breathing.   She also difficulty swallowing and cannot tolerate her oral antibiotics. She was given a dose of Decadron the emergency room started on Unasyn and observed overnight for further evaluation and treatment. She has improved overnight with IV antibiotics and Decadron. Past Medical History:     Past Medical History:   Diagnosis Date    ADHD (attention deficit hyperactivity disorder)     Allergic rhinitis     Anxiety     Depression     High risk HPV infection 4/29/14    LGSIL Pap smear of vagina 4/29/14    Pituitary disorder (HCC)     pituitary tumor    PTSD (post-traumatic stress disorder)     Vaginal high risk human papillomavirus (HPV) DNA test positive     Cuff; s/p vag hyst        Past Surgical History:     Past Surgical History:   Procedure Laterality Date    COLPOSCOPY  6/26/14    COLPOSCOPY  2/12/2015    due to lgsil on pap    HYSTERECTOMY, VAGINAL      OTHER SURGICAL HISTORY      Septaplasty    THROAT SURGERY  1980    cyst from throat        Medications Prior to Admission:     Prior to Admission medications    Medication Sig Start Date End Date Taking? Authorizing Provider   busPIRone (BUSPAR) 5 MG tablet Take 1 tablet by mouth 3 times daily  Patient taking differently: Take 5 mg by mouth 3 times daily as needed Pt taking only prn 10/19/21 1/17/22  Sara Elizalde PA-C        Allergies:     Patient has no known allergies. Social History:     Tobacco:    reports that she has never smoked. She has never used smokeless tobacco.  Alcohol:      reports current alcohol use. Drug Use:  reports no history of drug use.     Family History:     Family History   Problem Relation Age of Onset    Other Mother         low salt    Lupus Mother     Other Father         cholesterol    Heart Attack Father     Other Sister         low sodium    Depression Sister     Diabetes Maternal Grandmother     Heart Disease Maternal Grandmother     Diabetes Paternal Grandmother     Other Paternal Grandmother         brething problems    Lung Cancer Paternal Grandfather        Review of Systems:     Positive and Negative as described in HPI. CONSTITUTIONAL:  negative for fevers, chills, sweats, fatigue, weight loss  HEENT:  negative for vision, hearing changes, runny nose, throat pain  RESPIRATORY:  negative for shortness of breath, cough, congestion, wheezing  CARDIOVASCULAR:  negative for chest pain, palpitations  GASTROINTESTINAL:  negative for nausea, vomiting, diarrhea, constipation, change in bowel habits, abdominal pain   GENITOURINARY:  negative for difficulty of urination, burning with urination, frequency   INTEGUMENT:  negative for rash, skin lesions, easy bruising   HEMATOLOGIC/LYMPHATIC:  negative for swelling/edema   ALLERGIC/IMMUNOLOGIC:  negative for urticaria , itching  ENDOCRINE:  negative increase in drinking, increase in urination, hot or cold intolerance  MUSCULOSKELETAL:  negative joint pains, muscle aches, swelling of joints  NEUROLOGICAL:  negative for headaches, dizziness, lightheadedness, numbness, pain, tingling extremities  BEHAVIOR/PSYCH:  negative for depression, anxiety    Physical Exam:   /76   Pulse 58   Temp 98.5 °F (36.9 °C) (Oral)   Resp 16   Ht 5' 6\" (1.676 m)   Wt 150 lb 2.1 oz (68.1 kg)   SpO2 95%   BMI 24.23 kg/m²   Temp (24hrs), Av.6 °F (37 °C), Min:97.3 °F (36.3 °C), Max:100.2 °F (37.9 °C)    No results for input(s): POCGLU in the last 72 hours.     Intake/Output Summary (Last 24 hours) at 2021 0867  Last data filed at 2021 7840  Gross per 24 hour   Intake 3132 ml   Output 1350 ml   Net 1782 ml       General Appearance:  alert, well appearing, and in no acute distress  Mental status: oriented to person, place, and time  Head:  normocephalic, atraumatic  Eye: no icterus, redness, pupils equal and reactive, extraocular eye movements intact, conjunctiva clear  Ear: normal external ear, no discharge, hearing intact  Nose:  no drainage noted  Mouth: mucous membranes moist  Neck: supple, no carotid bruits, thyroid not palpable  Lungs: Bilateral equal air entry, clear to ausculation, no wheezing, rales or rhonchi, normal effort  Cardiovascular: normal rate, regular rhythm, no murmur, gallop, rub  Abdomen: Soft, nontender, nondistended, normal bowel sounds, no hepatomegaly or splenomegaly  Neurologic: There are no new focal motor or sensory deficits, normal muscle tone and bulk, no abnormal sensation, normal speech, cranial nerves II through XII grossly intact  Skin: No gross lesions, rashes, bruising or bleeding on exposed skin area  Extremities:  peripheral pulses palpable, no pedal edema or calf pain with palpation  Psych: normal affect     Investigations:      Laboratory Testing:  Recent Results (from the past 24 hour(s))   CBC Auto Differential    Collection Time: 11/24/21  2:35 PM   Result Value Ref Range    WBC 17.7 (H) 3.5 - 11.0 k/uL    RBC 3.94 (L) 4.0 - 5.2 m/uL    Hemoglobin 12.4 12.0 - 16.0 g/dL    Hematocrit 37.0 36 - 46 %    MCV 93.8 80 - 100 fL    MCH 31.4 26 - 34 pg    MCHC 33.4 31 - 37 g/dL    RDW 13.1 12.5 - 15.4 %    Platelets 620 310 - 788 k/uL    MPV 7.4 6.0 - 12.0 fL    NRBC Automated NOT REPORTED per 100 WBC    Differential Type NOT REPORTED     Seg Neutrophils 88 (H) 36 - 66 %    Lymphocytes 5 (L) 24 - 44 %    Monocytes 7 2 - 11 %    Eosinophils % 0 (L) 1 - 4 %    Basophils 0 0 - 2 %    Immature Granulocytes NOT REPORTED 0 %    Segs Absolute 15.50 (H) 1.8 - 7.7 k/uL    Absolute Lymph # 0.90 (L) 1.0 - 4.8 k/uL    Absolute Mono # 1.20 0.1 - 1.2 k/uL    Absolute Eos # 0.00 0.0 - 0.4 k/uL    Basophils Absolute 0.10 0.0 - 0.2 k/uL    Absolute Immature Granulocyte NOT REPORTED 0.00 - 0.30 k/uL    WBC Morphology NOT REPORTED     RBC Morphology NOT REPORTED     Platelet Estimate NOT REPORTED    Basic Metabolic Panel w/ Reflex to MG    Collection Time: 11/24/21  2:35 PM   Result Value Ref Range    Glucose 99 70 - 99 mg/dL    BUN 8 6 - 20 mg/dL    CREATININE 0.59 0.50 - 0.90 mg/dL Bun/Cre Ratio NOT REPORTED 9 - 20    Calcium 8.9 8.6 - 10.4 mg/dL    Sodium 131 (L) 135 - 144 mmol/L    Potassium 3.3 (L) 3.7 - 5.3 mmol/L    Chloride 95 (L) 98 - 107 mmol/L    CO2 23 20 - 31 mmol/L    Anion Gap 13 9 - 17 mmol/L    GFR Non-African American >60 >60 mL/min    GFR African American >60 >60 mL/min    GFR Comment          GFR Staging NOT REPORTED    Magnesium    Collection Time: 11/24/21  2:35 PM   Result Value Ref Range    Magnesium 1.7 1.6 - 2.6 mg/dL   COVID-19, Rapid    Collection Time: 11/24/21  4:00 PM    Specimen: Nasopharyngeal Swab   Result Value Ref Range    Specimen Description . NASOPHARYNGEAL SWAB     SARS-CoV-2, Rapid Not Detected Not Detected   Basic Metabolic Panel w/ Reflex to MG    Collection Time: 11/25/21  6:20 AM   Result Value Ref Range    Glucose 163 (H) 70 - 99 mg/dL    BUN 7 6 - 20 mg/dL    CREATININE 0.42 (L) 0.50 - 0.90 mg/dL    Bun/Cre Ratio NOT REPORTED 9 - 20    Calcium 8.6 8.6 - 10.4 mg/dL    Sodium 139 135 - 144 mmol/L    Potassium 3.7 3.7 - 5.3 mmol/L    Chloride 107 98 - 107 mmol/L    CO2 22 20 - 31 mmol/L    Anion Gap 10 9 - 17 mmol/L    GFR Non-African American >60 >60 mL/min    GFR African American >60 >60 mL/min    GFR Comment          GFR Staging NOT REPORTED    CBC    Collection Time: 11/25/21  6:20 AM   Result Value Ref Range    WBC 13.2 (H) 3.5 - 11.0 k/uL    RBC 3.73 (L) 4.0 - 5.2 m/uL    Hemoglobin 11.8 (L) 12.0 - 16.0 g/dL    Hematocrit 35.1 (L) 36 - 46 %    MCV 94.2 80 - 100 fL    MCH 31.6 26 - 34 pg    MCHC 33.5 31 - 37 g/dL    RDW 13.0 12.5 - 15.4 %    Platelets 497 134 - 636 k/uL    MPV 8.2 6.0 - 12.0 fL    NRBC Automated NOT REPORTED per 100 WBC       Imaging/Diagnostics:  CT SOFT TISSUE NECK W CONTRAST    Result Date: 11/24/2021  1. Bilateral palatine tonsillitis. No associated drainable collections. Visualized airway appears patent. 2. Cervical lymphadenopathy, likely reactive in nature.        Assessment :      Hospital Problems           Last Modified POA    * (Principal) Tonsillopharyngitis 11/25/2021 Yes    Prolactinoma ([facility] [address] (Chronic) 11/25/2021 Yes    Failure of outpatient treatment 11/25/2021 Yes    Hypokalemia 11/25/2021 Yes          Plan:     Patient status observation in the Med/Surge    1. Observation evaluation  2. IV Unasyn  3. IV Decadron limited course  4. Supplement potassium  5. Continue home maintenance medications  6. Advance diet  7.  Discharge home today on Augmentin for 7 days if tolerating diet    Consultations:   None        Minda Linares DO  11/25/2021  8:51 AM    Copy sent to Dr. Pam Jordan, PA-C

## 2021-11-29 ENCOUNTER — TELEPHONE (OUTPATIENT)
Dept: PRIMARY CARE CLINIC | Age: 41
End: 2021-11-29

## 2021-11-29 NOTE — TELEPHONE ENCOUNTER
[facility] Transitions Initial Follow Up Call    Outreach made within 2 business days of discharge: Yes    Patient: Ino Mcnulty Patient : 1980   MRN: [id number]  Reason for Admission: There are no discharge diagnoses documented for the most recent discharge. Discharge Date: 21       Spoke with: EDWARD    Discharge department/facility: [facility] Interactive Patient Contact:  Was patient able to fill all prescriptions: Yes  Was patient instructed to bring all medications to the follow-up visit: Yes  Is patient taking all medications as directed in the discharge summary?  Yes  Does patient understand their discharge instructions: Yes  Does patient have questions or concerns that need addressed prior to 7-14 day follow up office visit: no    Scheduled appointment with PCP within 7-14 days  [facility] [address] PCP     Follow Up  Future Appointments   Date Time Provider Raffi Francis   2021  2:00 PM Rod Nyhan, [address]       [place] Santos, [address] [place]

## 2021-11-30 ENCOUNTER — OFFICE VISIT (OUTPATIENT)
Dept: PRIMARY CARE CLINIC | Age: 41
End: 2021-11-30
Payer: COMMERCIAL

## 2021-11-30 VITALS
OXYGEN SATURATION: 97 % | TEMPERATURE: 97.5 F | WEIGHT: 152 LBS | HEIGHT: 66 IN | DIASTOLIC BLOOD PRESSURE: 80 MMHG | BODY MASS INDEX: 24.43 KG/M2 | SYSTOLIC BLOOD PRESSURE: 118 MMHG | HEART RATE: 106 BPM

## 2021-11-30 DIAGNOSIS — J02.9 TONSILLOPHARYNGITIS: Primary | ICD-10-CM

## 2021-11-30 PROCEDURE — 99213 OFFICE O/P EST LOW 20 MIN: CPT | Performed by: PHYSICIAN ASSISTANT

## 2021-11-30 ASSESSMENT — ENCOUNTER SYMPTOMS
SHORTNESS OF BREATH: 0
VOMITING: 0
RHINORRHEA: 1
DIARRHEA: 0
CHOKING: 0
TROUBLE SWALLOWING: 1
COUGH: 1
NAUSEA: 0

## 2021-11-30 NOTE — PROGRESS NOTES
[facility] CARE  [address] [place] [id number]  Dept: [phone]    Pankaj Marsh is a 35 y.o. female who presents today for her medical conditions/complaints as noted below. Chief Complaint   Patient presents with    Follow-up     pt is here today for ED f/u [address] for tonsilitis. HPI:     HPI  On 11-24-21 Samir Riggs went to ER for her tonsillitis and received Decadron and discharged with Augmentin. She is  On day 6 of Augmentin and getting much better. Still feels weak.   Is tachy today  LDL Cholesterol (mg/dL)   Date Value   07/09/2019 78     LDL Calculated (mg/dL)   Date Value   03/10/2018 98   03/21/2016 85       (goal LDL is <100)   AST (U/L)   Date Value   10/27/2021 13     ALT (U/L)   Date Value   10/27/2021 9     BUN (mg/dL)   Date Value   11/25/2021 7     BP Readings from Last 3 Encounters:   11/30/21 118/80   11/25/21 131/76   11/23/21 118/84          (goal 120/80)    Past Medical History:   Diagnosis Date    ADHD (attention deficit hyperactivity disorder)     Allergic rhinitis     Anxiety     Depression     High risk HPV infection 4/29/14    LGSIL Pap smear of vagina 4/29/14    Pituitary disorder (HCC)     pituitary tumor    PTSD (post-traumatic stress disorder)     Vaginal high risk human papillomavirus (HPV) DNA test positive     Cuff; s/p vag hyst      Past Surgical History:   Procedure Laterality Date    COLPOSCOPY  6/26/14    COLPOSCOPY  2/12/2015    due to lgsil on pap    HYSTERECTOMY, VAGINAL      OTHER SURGICAL HISTORY      Septaplasty    THROAT SURGERY  1980    cyst from throat       Family History   Problem Relation Age of Onset    Other Mother         low salt    Lupus Mother     Other Father         cholesterol    Heart Attack Father     Other Sister         low sodium    Depression Sister     Diabetes Maternal Grandmother     Heart Disease Maternal Grandmother     Diabetes 6\" (1.676 m)   Wt 152 lb (68.9 kg)   SpO2 97%   BMI 24.53 kg/m²   Physical Exam  Vitals and nursing note reviewed. Constitutional:       Appearance: Normal appearance. HENT:      Right Ear: Tympanic membrane, ear canal and external ear normal.      Left Ear: Tympanic membrane, ear canal and external ear normal.      Nose: Nose normal.      Mouth/Throat:      Lips: Pink. Mouth: Mucous membranes are moist.      Pharynx: Oropharynx is clear. No oropharyngeal exudate or posterior oropharyngeal erythema. Tonsils: Tonsillar abscess present. No tonsillar exudate. Cardiovascular:      Rate and Rhythm: Regular rhythm. Tachycardia present. Heart sounds: Normal heart sounds. Pulmonary:      Effort: Pulmonary effort is normal.      Breath sounds: Normal breath sounds. Lymphadenopathy:      Cervical: No cervical adenopathy. Neurological:      Mental Status: She is alert and oriented to person, place, and time. Psychiatric:         Mood and Affect: Mood normal.         Assessment:       Diagnosis Orders   1. Tonsillopharyngitis          Plan:    Complete Augmentin   Hydrate well  RTW on Thursday--forms completed  FMLA to be completed for 11/23/21--12/1/21    Return if symptoms worsen or fail to improve. No orders of the defined types were placed in this encounter. No orders of the defined types were placed in this encounter. Patient given educational materials - see patient instructions. Discussed use, benefit, and side effects of prescribed medications. All patient questions answered. Pt voiced understanding. Reviewed health maintenance. Instructed to continue current medications, diet and exercise. Patient agreed with treatment plan. Follow up as directed.      Electronically signed by Sara Elizalde PA-C on 11/30/2021 at 2:22 PM

## 2021-12-02 ENCOUNTER — TELEPHONE (OUTPATIENT)
Dept: PRIMARY CARE CLINIC | Age: 41
End: 2021-12-02

## 2021-12-02 NOTE — TELEPHONE ENCOUNTER
----- Message from Dulce Maria Newton sent at 12/1/2021  3:54 PM EST -----  Subject: Message to Provider    QUESTIONS  Information for Provider? Pt called and stated that her release to return   to work for tomorrow 12/02/2021 has not been received by her employer. Pt   states that she would like the office to refax the paper work so that she   can return to work. Fax number is [phone]. Please call pt back and   inform her when the fax has been resent.   ---------------------------------------------------------------------------  --------------  [address]  What is the best way for the office to contact you? OK to leave message on   voicemail  Preferred Call Back Phone Number? [phone]  ---------------------------------------------------------------------------  --------------  SCRIPT ANSWERS  Relationship to Patient?  Self

## 2021-12-08 ENCOUNTER — TELEPHONE (OUTPATIENT)
Dept: PRIMARY CARE CLINIC | Age: 41
End: 2021-12-08

## 2021-12-08 NOTE — TELEPHONE ENCOUNTER
Patient is calling that on her FMLA form certain sections are not completed     Section3,4,5 are not completed . Please let patient know when this is fixed and faxed back to her work .

## 2022-02-17 ENCOUNTER — OFFICE VISIT (OUTPATIENT)
Dept: OBGYN CLINIC | Age: 42
End: 2022-02-17
Payer: COMMERCIAL

## 2022-02-17 ENCOUNTER — HOSPITAL ENCOUNTER (OUTPATIENT)
Age: 42
Setting detail: SPECIMEN
Discharge: HOME | End: 2022-02-17

## 2022-02-17 VITALS — WEIGHT: 156 LBS | DIASTOLIC BLOOD PRESSURE: 72 MMHG | BODY MASS INDEX: 25.18 KG/M2 | SYSTOLIC BLOOD PRESSURE: 118 MMHG

## 2022-02-17 DIAGNOSIS — N94.9 VAGINAL LUMP: ICD-10-CM

## 2022-02-17 DIAGNOSIS — N94.9 GENITAL LESION, FEMALE: Primary | ICD-10-CM

## 2022-02-17 DIAGNOSIS — N94.9 GENITAL LESION, FEMALE: ICD-10-CM

## 2022-02-17 PROCEDURE — 99213 OFFICE O/P EST LOW 20 MIN: CPT | Performed by: NURSE PRACTITIONER

## 2022-02-17 RX ORDER — VALACYCLOVIR HYDROCHLORIDE 1 G/1
1000 TABLET, FILM COATED ORAL 2 TIMES DAILY
Qty: 20 TABLET | Refills: 0 | Status: SHIPPED | OUTPATIENT
Start: 2022-02-17 | End: 2022-02-27

## 2022-02-17 ASSESSMENT — ENCOUNTER SYMPTOMS
VOMITING: 0
NAUSEA: 0

## 2022-02-17 NOTE — PROGRESS NOTES
[address] [facility] [place]  [address] [address]  Dept: [phone]  Dept Fax: [phone]    Sergo Mckeon is a 35 y.o. female who presents today for her medical conditions/complaintsas noted below. Sergo Mckeon is c/o of Other (painful lump noticed 4 days ago. )        HPI:     HPI  Pt is here with complaints of a vaginal bump for past 5 days. She states tender when touch it. Denies noting anything like this in past. she denies any trauma prior to occurring. Denies increased in size or discharge from it. Denies vaginal discharge, Odor,   Itching. No UTI sx, no pelvic pain, fevers, chills, nausea/vomiting. No recent antibiotics,changes in soaps. HAs been waxing in that area recently  + change in partners, last sexually active 3 days prior to noting this.    LMP- HYSt      Past Medical History:   Diagnosis Date    ADHD (attention deficit hyperactivity disorder)     Allergic rhinitis     Anxiety     Depression     High risk HPV infection 4/29/14    LGSIL Pap smear of vagina 4/29/14    Pituitary disorder (HCC)     pituitary tumor    PTSD (post-traumatic stress disorder)     Vaginal high risk human papillomavirus (HPV) DNA test positive     Cuff; s/p vag hyst      Past Surgical History:   Procedure Laterality Date    COLPOSCOPY  6/26/14    COLPOSCOPY  2/12/2015    due to lgsil on pap    HYSTERECTOMY, VAGINAL      OTHER SURGICAL HISTORY      Septaplasty    THROAT SURGERY  1980    cyst from throat       Family History   Problem Relation Age of Onset    Other Mother         low salt    Lupus Mother     Other Father         cholesterol    Heart Attack Father     Other Sister         low sodium    Depression Sister     Diabetes Maternal Grandmother     Heart Disease Maternal Grandmother     Diabetes Paternal Grandmother     Other Paternal Grandmother         brething problems    Lung Cancer Paternal Grandfather        Social History     Tobacco Use    Smoking status: Never Smoker    Smokeless tobacco: Never Used   Substance Use Topics    Alcohol use: Yes     Alcohol/week: 0.0 standard drinks     Comment: SOCIAL      Current Outpatient Medications   Medication Sig Dispense Refill    valACYclovir (VALTREX) 1 g tablet Take 1 tablet by mouth 2 times daily for 10 days 20 tablet 0    phenol 1.4 % LIQD mouth spray Take 1 spray by mouth every 2 hours as needed for Sore Throat 1 mL 0     No current facility-administered medications for this visit. No Known Allergies    Health Maintenance   Topic Date Due    Hepatitis C screen  Never done    COVID-19 Vaccine (1) Never done    Flu vaccine (1) 09/01/2021    Depression Monitoring  11/23/2022    Breast cancer screen  06/11/2023    Lipid screen  07/09/2024    Diabetes screen  10/27/2024    DTaP/Tdap/Td vaccine (3 - Td or Tdap) 08/22/2025    HIV screen  Completed    Hepatitis A vaccine  Aged Out    Hepatitis B vaccine  Aged Out    Hib vaccine  Aged Out    Meningococcal (ACWY) vaccine  Aged Out    Pneumococcal 0-64 years Vaccine  Aged Out       Subjective:     Review of Systems   Constitutional: Negative for chills, fatigue and fever. Gastrointestinal: Negative for nausea and vomiting. Genitourinary: Positive for genital sores and vaginal pain. Negative for dyspareunia, dysuria, flank pain, pelvic pain, urgency, vaginal bleeding and vaginal discharge. Neurological: Negative for dizziness and light-headedness. Hematological: Negative for adenopathy. Objective:     Physical Exam  Vitals and nursing note reviewed. Constitutional:       General: She is not in acute distress. Appearance: She is well-developed. She is not diaphoretic. HENT:      Head: Normocephalic and atraumatic.       Right Ear: External ear normal.      Left Ear: External ear normal.      Nose: Nose normal.   Eyes:      Pupils: Pupils are equal, round, and reactive to light.   Neck:      Thyroid: No thyromegaly. Cardiovascular:      Rate and Rhythm: Normal rate and regular rhythm. Heart sounds: Normal heart sounds. No murmur heard. No friction rub. No gallop. Pulmonary:      Effort: Pulmonary effort is normal.      Breath sounds: Normal breath sounds. No wheezing. Abdominal:      General: Bowel sounds are normal.      Palpations: Abdomen is soft. Tenderness: There is no abdominal tenderness. Genitourinary:      Musculoskeletal:         General: Normal range of motion. Cervical back: Normal range of motion and neck supple. Lymphadenopathy:      Cervical: No cervical adenopathy. Skin:     General: Skin is warm and dry. Findings: No rash. Neurological:      Mental Status: She is alert and oriented to person, place, and time. Cranial Nerves: No cranial nerve deficit. Psychiatric:         Behavior: Behavior normal.         Thought Content: Thought content normal.         Judgment: Judgment normal.       /72 (Site: Right Upper Arm, Position: Sitting, Cuff Size: Medium Adult)   Wt 156 lb (70.8 kg)   Breastfeeding No   BMI 25.18 kg/m²     Assessment:       Diagnosis Orders   1. Genital lesion, female  Herpes Simplex 1 & 2, Molecular   2. Vaginal lump             Plan:      Vaginal lesion-  check HSV culture, sent in valtrex to start in case positive. If negative can trial antibiotic   No  pelvic tenderness with exam, denies consitutional symptoms. Monitor for fevers, chills, n/v, abdominal/pelvic let us know if occurs. Encouraged  safe sex practices.        Return if symptoms worsen or fail to improve, for annual exam.    Orders Placed This Encounter   Procedures    Herpes Simplex 1 & 2, Molecular     Standing Status:   Future     Standing Expiration Date:   2/17/2023     Orders Placed This Encounter   Medications    valACYclovir (VALTREX) 1 g tablet     Sig: Take 1 tablet by mouth 2 times daily for 10 days     Dispense:  20 tablet Refill:  0       Patient given educational materials - seepatient instructions. Discussed use, benefit, and side effects of prescribed medications. All patient questions answered. Pt voiced understanding. Reviewed health maintenance. Instructed to continue current medications, diet and exercise. Patient agreedwith treatment plan. Follow up as directed. Electronically signed by PADMINI Whatley CNP on 2/17/2022at 12:49 PM      Of the 20 minute duration appointment visit, Rin Boyce CNP spent at least 50% of the face-to-face time in counseling, explanation of diagnosis, planning of further management, and answering all questions.

## 2022-02-18 LAB
HSV 1, NAAT: NEGATIVE
HSV 2, NAAT: POSITIVE
SPECIMEN DESCRIPTION: ABNORMAL

## 2022-03-30 ENCOUNTER — PATIENT MESSAGE (OUTPATIENT)
Dept: OBGYN CLINIC | Age: 42
End: 2022-03-30

## 2022-03-30 RX ORDER — VALACYCLOVIR HYDROCHLORIDE 1 G/1
1000 TABLET, FILM COATED ORAL 2 TIMES DAILY
Qty: 20 TABLET | Refills: 0 | Status: SHIPPED | OUTPATIENT
Start: 2022-03-30 | End: 2022-04-18 | Stop reason: SDUPTHER

## 2022-03-30 NOTE — TELEPHONE ENCOUNTER
From: Eric Fyr  To: Sarah Moreno  Sent: 3/30/2022 8:13 AM EDT  Subject: Antibiotics     I am having an outbreak and need a round of antibiotics called in if possible please, thank you.

## 2022-04-15 ENCOUNTER — PATIENT MESSAGE (OUTPATIENT)
Dept: OBGYN CLINIC | Age: 42
End: 2022-04-15

## 2022-04-18 RX ORDER — VALACYCLOVIR HYDROCHLORIDE 1 G/1
TABLET, FILM COATED ORAL
Qty: 20 TABLET | Refills: 0 | OUTPATIENT
Start: 2022-04-18

## 2022-04-18 RX ORDER — VALACYCLOVIR HYDROCHLORIDE 1 G/1
1000 TABLET, FILM COATED ORAL 2 TIMES DAILY
Qty: 20 TABLET | Refills: 0 | Status: SHIPPED | OUTPATIENT
Start: 2022-04-18 | End: 2022-04-28

## 2022-04-18 NOTE — TELEPHONE ENCOUNTER
From: Bernabe Zapien  To: Roel Pan  Sent: 4/15/2022 8:29 AM EDT  Subject: Anti vital    I am still having some symptoms so I was hoping you could call me in another round of the anti-viral? Please and thank you.